# Patient Record
Sex: MALE | Race: WHITE | Employment: FULL TIME | URBAN - METROPOLITAN AREA
[De-identification: names, ages, dates, MRNs, and addresses within clinical notes are randomized per-mention and may not be internally consistent; named-entity substitution may affect disease eponyms.]

---

## 2017-11-13 ENCOUNTER — TELEPHONE (OUTPATIENT)
Dept: FAMILY MEDICINE CLINIC | Age: 52
End: 2017-11-13

## 2017-11-13 DIAGNOSIS — Z12.5 SCREENING FOR PROSTATE CANCER: ICD-10-CM

## 2017-11-13 DIAGNOSIS — R73.01 IFG (IMPAIRED FASTING GLUCOSE): ICD-10-CM

## 2017-11-13 DIAGNOSIS — E78.2 MIXED HYPERLIPIDEMIA: Primary | ICD-10-CM

## 2017-12-09 ENCOUNTER — HOSPITAL ENCOUNTER (OUTPATIENT)
Age: 52
Discharge: HOME | End: 2017-12-09
Payer: COMMERCIAL

## 2017-12-09 DIAGNOSIS — E78.2 MIXED HYPERLIPIDEMIA: ICD-10-CM

## 2017-12-09 DIAGNOSIS — R73.01 IFG (IMPAIRED FASTING GLUCOSE): ICD-10-CM

## 2017-12-09 DIAGNOSIS — Z12.5 SCREENING FOR PROSTATE CANCER: ICD-10-CM

## 2017-12-09 LAB
ALBUMIN SERPL-MCNC: 4.3 G/DL (ref 3.5–5.1)
ALP BLD-CCNC: 70 U/L (ref 38–126)
ALT SERPL-CCNC: 75 U/L (ref 11–66)
ANION GAP SERPL CALCULATED.3IONS-SCNC: 13 MEQ/L (ref 8–16)
AST SERPL-CCNC: 44 U/L (ref 5–40)
AVERAGE GLUCOSE: 108 MG/DL (ref 70–126)
BILIRUB SERPL-MCNC: 0.5 MG/DL (ref 0.3–1.2)
BUN BLDV-MCNC: 11 MG/DL (ref 7–22)
CALCIUM SERPL-MCNC: 9.4 MG/DL (ref 8.5–10.5)
CHLORIDE BLD-SCNC: 105 MEQ/L (ref 98–111)
CHOLESTEROL, TOTAL: 253 MG/DL (ref 100–199)
CO2: 26 MEQ/L (ref 23–33)
CREAT SERPL-MCNC: 0.9 MG/DL (ref 0.4–1.2)
GFR SERPL CREATININE-BSD FRML MDRD: 88 ML/MIN/1.73M2
GLUCOSE BLD-MCNC: 110 MG/DL (ref 70–108)
HBA1C MFR BLD: 5.6 % (ref 4.4–6.4)
HDLC SERPL-MCNC: 67 MG/DL
LDL CHOLESTEROL CALCULATED: 169 MG/DL
POTASSIUM SERPL-SCNC: 4.8 MEQ/L (ref 3.5–5.2)
PROSTATE SPECIFIC ANTIGEN: 0.82 NG/ML (ref 0–1)
SODIUM BLD-SCNC: 144 MEQ/L (ref 135–145)
TOTAL PROTEIN: 7.2 G/DL (ref 6.1–8)
TRIGL SERPL-MCNC: 83 MG/DL (ref 0–199)

## 2017-12-09 PROCEDURE — 36415 COLL VENOUS BLD VENIPUNCTURE: CPT

## 2017-12-09 PROCEDURE — 83036 HEMOGLOBIN GLYCOSYLATED A1C: CPT

## 2017-12-09 PROCEDURE — G0103 PSA SCREENING: HCPCS

## 2017-12-09 PROCEDURE — 80061 LIPID PANEL: CPT

## 2017-12-09 PROCEDURE — 80053 COMPREHEN METABOLIC PANEL: CPT

## 2017-12-11 ENCOUNTER — TELEPHONE (OUTPATIENT)
Dept: FAMILY MEDICINE CLINIC | Age: 52
End: 2017-12-11

## 2017-12-11 DIAGNOSIS — R74.01 ELEVATED AST (SGOT): Primary | ICD-10-CM

## 2017-12-11 DIAGNOSIS — R74.01 ELEVATED ALT MEASUREMENT: ICD-10-CM

## 2018-02-14 ENCOUNTER — OFFICE VISIT (OUTPATIENT)
Dept: FAMILY MEDICINE CLINIC | Age: 53
End: 2018-02-14
Payer: COMMERCIAL

## 2018-02-14 VITALS
RESPIRATION RATE: 16 BRPM | SYSTOLIC BLOOD PRESSURE: 110 MMHG | DIASTOLIC BLOOD PRESSURE: 70 MMHG | HEIGHT: 69 IN | WEIGHT: 207 LBS | TEMPERATURE: 98.1 F | HEART RATE: 64 BPM | BODY MASS INDEX: 30.66 KG/M2

## 2018-02-14 DIAGNOSIS — Z00.00 WELL ADULT EXAM: Primary | ICD-10-CM

## 2018-02-14 DIAGNOSIS — R79.89 ELEVATED LFTS: ICD-10-CM

## 2018-02-14 DIAGNOSIS — R53.83 OTHER FATIGUE: ICD-10-CM

## 2018-02-14 DIAGNOSIS — E78.2 MIXED HYPERLIPIDEMIA: ICD-10-CM

## 2018-02-14 DIAGNOSIS — N52.9 ERECTILE DYSFUNCTION, UNSPECIFIED ERECTILE DYSFUNCTION TYPE: ICD-10-CM

## 2018-02-14 PROCEDURE — 99396 PREV VISIT EST AGE 40-64: CPT | Performed by: FAMILY MEDICINE

## 2018-02-14 RX ORDER — SILDENAFIL CITRATE 20 MG/1
TABLET ORAL
Qty: 50 TABLET | Refills: 0 | Status: SHIPPED | OUTPATIENT
Start: 2018-02-14 | End: 2019-04-22 | Stop reason: ALTCHOICE

## 2018-02-14 RX ORDER — BETAMETHASONE DIPROPIONATE 0.5 MG/G
CREAM TOPICAL
Qty: 50 G | Refills: 0 | Status: SHIPPED | OUTPATIENT
Start: 2018-02-14 | End: 2019-10-10 | Stop reason: SDUPTHER

## 2018-02-14 ASSESSMENT — ENCOUNTER SYMPTOMS
CONSTIPATION: 0
SHORTNESS OF BREATH: 0
CHEST TIGHTNESS: 0
ANAL BLEEDING: 0
VOMITING: 0
DIARRHEA: 0
BLOOD IN STOOL: 0
ABDOMINAL PAIN: 0
NAUSEA: 0

## 2018-02-14 ASSESSMENT — PATIENT HEALTH QUESTIONNAIRE - PHQ9
1. LITTLE INTEREST OR PLEASURE IN DOING THINGS: 0
2. FEELING DOWN, DEPRESSED OR HOPELESS: 0
SUM OF ALL RESPONSES TO PHQ9 QUESTIONS 1 & 2: 0
SUM OF ALL RESPONSES TO PHQ QUESTIONS 1-9: 0

## 2018-02-14 NOTE — PROGRESS NOTES
Left Ear: External ear normal.   Nose: Nose normal.   Mouth/Throat: Oropharynx is clear and moist.   Eyes: Conjunctivae and EOM are normal. Pupils are equal, round, and reactive to light. Neck: Normal range of motion. Neck supple. Cardiovascular: Normal rate, regular rhythm and intact distal pulses. Pulmonary/Chest: Effort normal and breath sounds normal.   Abdominal: Soft. Bowel sounds are normal.   Genitourinary:   Genitourinary Comments: MARYURI deferred today as pt currently asymptomatic. Pt denies dysuria, hematuria, frequency, urgency, difficulty starting/ stopping stream, frequent nocturia    Will reconsider annually. MARYURI to be done at time of upcoming COLONOSCOPY. ES     Musculoskeletal: Normal range of motion. Neurological: He is alert and oriented to person, place, and time. He has normal reflexes. Skin: Skin is warm and dry. Mildly erythematous, hyperkeratotic lesion on left forearm- no bleeding, no drainage, no worrisome characteristics. Mildly erythematous beefy, red rash beneath bilateral breasts- no worrisome characteristics. Psychiatric: He has a normal mood and affect. His behavior is normal. Judgment and thought content normal.   Nursing note and vitals reviewed.       Component      Latest Ref Rng & Units 12/9/2017   Glucose      70 - 108 mg/dL 110 (H)   Creatinine      0.4 - 1.2 mg/dL 0.9   BUN      7 - 22 mg/dL 11   Sodium      135 - 145 meq/L 144   Potassium      3.5 - 5.2 meq/L 4.8   Chloride      98 - 111 meq/L 105   CO2      23 - 33 meq/L 26   Calcium      8.5 - 10.5 mg/dL 9.4   AST      5 - 40 U/L 44 (H)   Alk Phos      38 - 126 U/L 70   Total Protein      6.1 - 8.0 g/dL 7.2   Albumin      3.5 - 5.1 g/dL 4.3   Bilirubin      0.3 - 1.2 mg/dL 0.5   ALT      11 - 66 U/L 75 (H)   Cholesterol, Total      100 - 199 mg/dL 253 (H)   Triglycerides      0 - 199 mg/dL 83   HDL Cholesterol      mg/dL 67   LDL Calculated      mg/dL 169   Hemoglobin A1C      4.4 - 6.4 % 5.6

## 2018-07-11 ENCOUNTER — OFFICE VISIT (OUTPATIENT)
Dept: FAMILY MEDICINE CLINIC | Age: 53
End: 2018-07-11
Payer: COMMERCIAL

## 2018-07-11 VITALS
HEART RATE: 60 BPM | WEIGHT: 207.6 LBS | RESPIRATION RATE: 16 BRPM | BODY MASS INDEX: 30.88 KG/M2 | DIASTOLIC BLOOD PRESSURE: 72 MMHG | TEMPERATURE: 98 F | SYSTOLIC BLOOD PRESSURE: 112 MMHG

## 2018-07-11 DIAGNOSIS — S16.1XXA CERVICAL STRAIN, ACUTE, INITIAL ENCOUNTER: Primary | ICD-10-CM

## 2018-07-11 PROCEDURE — 3017F COLORECTAL CA SCREEN DOC REV: CPT | Performed by: NURSE PRACTITIONER

## 2018-07-11 PROCEDURE — G8427 DOCREV CUR MEDS BY ELIG CLIN: HCPCS | Performed by: NURSE PRACTITIONER

## 2018-07-11 PROCEDURE — 1036F TOBACCO NON-USER: CPT | Performed by: NURSE PRACTITIONER

## 2018-07-11 PROCEDURE — 99213 OFFICE O/P EST LOW 20 MIN: CPT | Performed by: NURSE PRACTITIONER

## 2018-07-11 PROCEDURE — G8417 CALC BMI ABV UP PARAM F/U: HCPCS | Performed by: NURSE PRACTITIONER

## 2018-07-11 RX ORDER — PREDNISONE 10 MG/1
10 TABLET ORAL SEE ADMIN INSTRUCTIONS
Qty: 30 TABLET | Refills: 0 | Status: SHIPPED | OUTPATIENT
Start: 2018-07-11 | End: 2018-07-11 | Stop reason: CLARIF

## 2018-07-11 RX ORDER — TIZANIDINE 4 MG/1
4 TABLET ORAL EVERY 8 HOURS PRN
Qty: 21 TABLET | Refills: 0 | Status: SHIPPED | OUTPATIENT
Start: 2018-07-11 | End: 2019-04-22 | Stop reason: ALTCHOICE

## 2018-07-11 RX ORDER — PREDNISONE 10 MG/1
10 TABLET ORAL SEE ADMIN INSTRUCTIONS
Qty: 30 TABLET | Refills: 0 | Status: SHIPPED | OUTPATIENT
Start: 2018-07-11 | End: 2018-07-23

## 2018-07-11 ASSESSMENT — ENCOUNTER SYMPTOMS
EYE DISCHARGE: 0
NAUSEA: 0
DIARRHEA: 0
STRIDOR: 0
BACK PAIN: 0
VOMITING: 0
SHORTNESS OF BREATH: 0
COUGH: 0
ABDOMINAL PAIN: 0
SORE THROAT: 0
COLOR CHANGE: 0
WHEEZING: 0
EYE PAIN: 0
RHINORRHEA: 0
CONSTIPATION: 0

## 2018-07-11 NOTE — PROGRESS NOTES
[facility]  [facility]  [address] [id number]  Dept: [phone]  Dept Fax: [phone]: [phone]     Visit Date:  7/11/2018      Patient:  Beckie Heredia  YOB: 1965    HPI:     Chief Complaint   Patient presents with    Neck Pain     ? pinched nerve in neck, burning pain in neck, no n/t into arms       Patient, who has intermittent neck pain with muscle spasms, presents with an acute onset over the last couple weeks of right cervical pain in the para spinous muscles down into the trapezius and the top of the shoulder. He denies radiculopathy down into the right arm, no numbness or tingling. He has had this off and on over the last several years and generally treats it with ibuprofen and rest.  He was seen here in 2016 for the same and evaluated by his PCP, with similar symptoms he is having today. Patient sees his chiropractor and has not been able to get relief currently. Neck Pain    Pertinent negatives include no chest pain, fever, headaches or weakness. Medications    Current Outpatient Prescriptions:     tiZANidine (ZANAFLEX) 4 MG tablet, Take 1 tablet by mouth every 8 hours as needed (neck pain), Disp: 21 tablet, Rfl: 0    predniSONE (DELTASONE) 10 MG tablet, Take 1 tablet by mouth See Admin Instructions for 12 days 40 mgs p.o x 3 day. 30 mgs p.o x 3 day. 20 mgs p.o x 3 day. 10 mgs p.o x 3 day., Disp: 30 tablet, Rfl: 0    sildenafil (REVATIO) 20 MG tablet, take 2-5 pills daily as needed for sexual activity. , Disp: 50 tablet, Rfl: 0    The patient has No Known Allergies. Past Medical History  Valerie Troncoso  has a past medical history of Erectile dysfunction and Hyperlipidemia. Subjective:      Review of Systems   Constitutional: Negative for activity change, appetite change, chills, fatigue and fever. HENT: Negative for congestion, ear pain, rhinorrhea and sore throat. Eyes: Negative for pain and discharge. Respiratory: Negative for cough, shortness of breath, wheezing and stridor. Cardiovascular: Negative for chest pain. Gastrointestinal: Negative for abdominal pain, constipation, diarrhea, nausea and vomiting. Genitourinary: Negative for dysuria, flank pain and frequency. Musculoskeletal: Positive for myalgias and neck pain. Negative for arthralgias and back pain. Skin: Negative for color change and rash. Allergic/Immunologic: Negative for immunocompromised state. Neurological: Negative for dizziness, weakness and headaches. Psychiatric/Behavioral: Negative. Objective:     /72   Pulse 60   Temp 98 °F (36.7 °C) (Oral)   Resp 16   Wt 207 lb 9.6 oz (94.2 kg) Comment: with steel toe boots  BMI 30.88 kg/m²     Physical Exam   Constitutional: He is oriented to person, place, and time. He appears well-developed and well-nourished. No distress. Eyes: Conjunctivae are normal. Right eye exhibits no discharge. Left eye exhibits no discharge. Cardiovascular: Normal rate. Pulmonary/Chest: Effort normal. No respiratory distress. Musculoskeletal: Normal range of motion. He exhibits tenderness. He exhibits no edema. Neurological: He is alert and oriented to person, place, and time. Skin: Skin is warm and dry. No rash noted. He is not diaphoretic. No erythema. No pallor. Psychiatric: He has a normal mood and affect. His behavior is normal. Judgment and thought content normal.   Nursing note and vitals reviewed. Assessment/Plan:      Presents with normal range of motion, but increased pain with flexing and twisting of his head. He is tender to palpation in the right cervical paraspinous muscle group down into his trapezius and the top of his right shoulder. No obvious cramping is noted but the muscle groups are tight. Normal PMS of right arm and hand. Patient declines further evaluation via MRI or other testing.   He would prefer to stay conservative with prednisone and

## 2018-07-31 ENCOUNTER — TELEPHONE (OUTPATIENT)
Dept: FAMILY MEDICINE CLINIC | Age: 53
End: 2018-07-31

## 2018-07-31 DIAGNOSIS — M54.2 CERVICAL PAIN: ICD-10-CM

## 2018-07-31 DIAGNOSIS — S16.1XXD STRAIN OF NECK MUSCLE, SUBSEQUENT ENCOUNTER: Primary | ICD-10-CM

## 2019-04-22 ENCOUNTER — OFFICE VISIT (OUTPATIENT)
Dept: FAMILY MEDICINE CLINIC | Age: 54
End: 2019-04-22
Payer: COMMERCIAL

## 2019-04-22 VITALS
DIASTOLIC BLOOD PRESSURE: 70 MMHG | HEART RATE: 60 BPM | WEIGHT: 201 LBS | TEMPERATURE: 98.6 F | HEIGHT: 68 IN | BODY MASS INDEX: 30.46 KG/M2 | RESPIRATION RATE: 16 BRPM | SYSTOLIC BLOOD PRESSURE: 100 MMHG

## 2019-04-22 DIAGNOSIS — R73.01 IFG (IMPAIRED FASTING GLUCOSE): ICD-10-CM

## 2019-04-22 DIAGNOSIS — Z00.00 LABORATORY EXAM ORDERED AS PART OF ROUTINE GENERAL MEDICAL EXAMINATION: ICD-10-CM

## 2019-04-22 DIAGNOSIS — Z13.220 SCREENING, LIPID: ICD-10-CM

## 2019-04-22 DIAGNOSIS — N52.9 ERECTILE DYSFUNCTION, UNSPECIFIED ERECTILE DYSFUNCTION TYPE: ICD-10-CM

## 2019-04-22 DIAGNOSIS — Z13.1 SCREENING FOR DIABETES MELLITUS: ICD-10-CM

## 2019-04-22 DIAGNOSIS — E78.2 MIXED HYPERLIPIDEMIA: ICD-10-CM

## 2019-04-22 DIAGNOSIS — Z00.00 WELL ADULT EXAM: Primary | ICD-10-CM

## 2019-04-22 DIAGNOSIS — Z12.5 SCREENING PSA (PROSTATE SPECIFIC ANTIGEN): ICD-10-CM

## 2019-04-22 DIAGNOSIS — R68.82 DECREASED LIBIDO: ICD-10-CM

## 2019-04-22 PROCEDURE — 99396 PREV VISIT EST AGE 40-64: CPT | Performed by: FAMILY MEDICINE

## 2019-04-22 RX ORDER — SILDENAFIL 100 MG/1
50-100 TABLET, FILM COATED ORAL DAILY PRN
Qty: 30 TABLET | Refills: 1 | Status: SHIPPED | OUTPATIENT
Start: 2019-04-22 | End: 2022-01-31 | Stop reason: SDUPTHER

## 2019-04-22 ASSESSMENT — ENCOUNTER SYMPTOMS
CONSTIPATION: 0
DIARRHEA: 0
ABDOMINAL PAIN: 0
BLOOD IN STOOL: 0
SHORTNESS OF BREATH: 0
VOMITING: 0
ANAL BLEEDING: 0
NAUSEA: 0
CHEST TIGHTNESS: 0

## 2019-04-22 ASSESSMENT — PATIENT HEALTH QUESTIONNAIRE - PHQ9
SUM OF ALL RESPONSES TO PHQ9 QUESTIONS 1 & 2: 0
2. FEELING DOWN, DEPRESSED OR HOPELESS: 0
SUM OF ALL RESPONSES TO PHQ QUESTIONS 1-9: 0
SUM OF ALL RESPONSES TO PHQ QUESTIONS 1-9: 0
1. LITTLE INTEREST OR PLEASURE IN DOING THINGS: 0

## 2019-04-22 NOTE — PROGRESS NOTES
sounds normal.   Abdominal: Soft. Bowel sounds are normal.   Genitourinary:   Genitourinary Comments: MARYURI done 4/17/2018 per Dr. Torres Griffiths- unremarkable. MARYURI deferred today as pt currently asymptomatic. Pt denies dysuria, hematuria, frequency, urgency, difficulty starting/ stopping stream, frequent nocturia    Will reconsider annually. ES     Musculoskeletal: Normal range of motion. Neurological: He is alert and oriented to person, place, and time. He has normal reflexes. Skin: Skin is warm and dry. Psychiatric: He has a normal mood and affect. His behavior is normal. Judgment and thought content normal.   Nursing note and vitals reviewed.       Lab Results   Component Value Date    LABA1C 5.6 12/09/2017     Lab Results   Component Value Date    CHOL 253 (H) 12/09/2017    TRIG 83 12/09/2017    HDL 67 12/09/2017    LDLCALC 169 12/09/2017    LDLDIRECT 188 11/19/2015       The 10-year ASCVD risk score (Venkatesh Cardoso, et al., 2013) is: 3.1%    Values used to calculate the score:      Age: 48 years      Sex: Male      Is Non- : No      Diabetic: No      Tobacco smoker: No      Systolic Blood Pressure: 067 mmHg      Is BP treated: No      HDL Cholesterol: 67 mg/dL      Total Cholesterol: 253 mg/dL    Lab Results   Component Value Date     12/09/2017    K 4.8 12/09/2017     12/09/2017    CO2 26 12/09/2017    BUN 11 12/09/2017    CREATININE 0.9 12/09/2017    GLUCOSE 110 (H) 12/09/2017    CALCIUM 9.4 12/09/2017    PROT 7.2 12/09/2017    LABALBU 4.3 12/09/2017    BILITOT 0.5 12/09/2017    ALKPHOS 70 12/09/2017    AST 44 (H) 12/09/2017    ALT 75 (H) 12/09/2017    LABGLOM 88 (A) 12/09/2017     No results found for: LABMICR, [id number]    No results found for: TSH, D0UMCJU, A0PRQLV, THYROIDAB, FT3, T4FREE    No results found for: WBC, HGB, HCT, MCV, PLT    Component      Latest Ref Rng & Units 12/9/2017          10:05 AM   Prostatic Specific Ag      0.00 - 1.00 ng/ml 0.82 4/22/2019)  Viagra 100 mg- 1/2-1 pill daily as needed for Erectile Dysfunction. #30/1 refill. Continue to work on diet, exercise (30 minutes, 5x/week), and weight loss for optimal cardiovascular health. Recheck HGA1C, FLP, CMP, PSA, and  FREE & TOTAL TESTOSTERONE at this time  Continue current medicines  No other refills needed today.    Follow up in 12 months if feeling well and labs well controlled       Electronically signed Nia Mata MD on 4/22/2019 at 1:42 PM

## 2019-04-22 NOTE — PATIENT INSTRUCTIONS
Encouraged annual FLU SHOT- pt declines. (updated 4/22/2019)  COLONOSCOPY done 4/17/2018 per Dr. Shahnaz Brandt- 1 polyp- to do again in 2023  Pt declines HIV and HEP C screening at this time due to lack of risk factors. (updated 4/22/2019)  Viagra 100 mg- 1/2-1 pill daily as needed for Erectile Dysfunction. #30/1 refill. Continue to work on diet, exercise (30 minutes, 5x/week), and weight loss for optimal cardiovascular health. Recheck HGA1C, FLP, CMP, PSA, and  FREE & TOTAL TESTOSTERONE at this time  Continue current medicines  No other refills needed today.    Follow up in 12 months if feeling well and labs well controlled

## 2019-04-24 LAB
AVERAGE GLUCOSE: NORMAL
CHOLESTEROL, TOTAL: 251 MG/DL
CHOLESTEROL/HDL RATIO: ABNORMAL
HBA1C MFR BLD: 5.5 %
HDLC SERPL-MCNC: 63 MG/DL (ref 35–70)
LDL CHOLESTEROL CALCULATED: 170 MG/DL (ref 0–160)
PROSTATE SPECIFIC ANTIGEN: 0.85 NG/ML
TRIGL SERPL-MCNC: 92 MG/DL
VLDLC SERPL CALC-MCNC: ABNORMAL MG/DL

## 2019-07-17 ENCOUNTER — TELEPHONE (OUTPATIENT)
Dept: FAMILY MEDICINE CLINIC | Age: 54
End: 2019-07-17

## 2019-07-19 ENCOUNTER — TELEPHONE (OUTPATIENT)
Dept: FAMILY MEDICINE CLINIC | Age: 54
End: 2019-07-19

## 2019-07-19 DIAGNOSIS — R74.01 ELEVATED ALT MEASUREMENT: Primary | ICD-10-CM

## 2019-10-10 ENCOUNTER — OFFICE VISIT (OUTPATIENT)
Dept: FAMILY MEDICINE CLINIC | Age: 54
End: 2019-10-10
Payer: COMMERCIAL

## 2019-10-10 VITALS
DIASTOLIC BLOOD PRESSURE: 72 MMHG | WEIGHT: 202.4 LBS | HEART RATE: 68 BPM | SYSTOLIC BLOOD PRESSURE: 114 MMHG | TEMPERATURE: 98.7 F | RESPIRATION RATE: 15 BRPM | BODY MASS INDEX: 30.77 KG/M2

## 2019-10-10 DIAGNOSIS — R05.9 COUGH: Primary | ICD-10-CM

## 2019-10-10 DIAGNOSIS — R53.83 FATIGUE, UNSPECIFIED TYPE: ICD-10-CM

## 2019-10-10 DIAGNOSIS — R11.11 NON-INTRACTABLE VOMITING WITHOUT NAUSEA, UNSPECIFIED VOMITING TYPE: ICD-10-CM

## 2019-10-10 PROCEDURE — 1036F TOBACCO NON-USER: CPT | Performed by: NURSE PRACTITIONER

## 2019-10-10 PROCEDURE — G8417 CALC BMI ABV UP PARAM F/U: HCPCS | Performed by: NURSE PRACTITIONER

## 2019-10-10 PROCEDURE — G8427 DOCREV CUR MEDS BY ELIG CLIN: HCPCS | Performed by: NURSE PRACTITIONER

## 2019-10-10 PROCEDURE — 3017F COLORECTAL CA SCREEN DOC REV: CPT | Performed by: NURSE PRACTITIONER

## 2019-10-10 PROCEDURE — G8484 FLU IMMUNIZE NO ADMIN: HCPCS | Performed by: NURSE PRACTITIONER

## 2019-10-10 PROCEDURE — 99213 OFFICE O/P EST LOW 20 MIN: CPT | Performed by: NURSE PRACTITIONER

## 2019-10-10 RX ORDER — BETAMETHASONE DIPROPIONATE 0.5 MG/G
CREAM TOPICAL
Qty: 50 G | Refills: 0 | Status: SHIPPED | OUTPATIENT
Start: 2019-10-10 | End: 2019-11-09

## 2019-10-10 RX ORDER — BENZONATATE 100 MG/1
100-200 CAPSULE ORAL 3 TIMES DAILY PRN
Qty: 30 CAPSULE | Refills: 0 | Status: SHIPPED | OUTPATIENT
Start: 2019-10-10 | End: 2019-10-17

## 2019-10-11 ASSESSMENT — ENCOUNTER SYMPTOMS
ABDOMINAL PAIN: 0
CHEST TIGHTNESS: 0
VOMITING: 1
BLOOD IN STOOL: 0
COUGH: 1
EYES NEGATIVE: 1
SHORTNESS OF BREATH: 0

## 2021-06-01 ENCOUNTER — TELEPHONE (OUTPATIENT)
Dept: FAMILY MEDICINE CLINIC | Age: 56
End: 2021-06-01

## 2021-06-04 NOTE — TELEPHONE ENCOUNTER
Called pt and LM on VM asking him to call the office back if he is still interested in scheduling an appointment.

## 2022-01-31 ENCOUNTER — NURSE ONLY (OUTPATIENT)
Dept: LAB | Age: 57
End: 2022-01-31

## 2022-01-31 ENCOUNTER — OFFICE VISIT (OUTPATIENT)
Dept: FAMILY MEDICINE CLINIC | Age: 57
End: 2022-01-31
Payer: COMMERCIAL

## 2022-01-31 VITALS
HEART RATE: 64 BPM | SYSTOLIC BLOOD PRESSURE: 114 MMHG | DIASTOLIC BLOOD PRESSURE: 72 MMHG | BODY MASS INDEX: 31.78 KG/M2 | RESPIRATION RATE: 16 BRPM | WEIGHT: 209 LBS

## 2022-01-31 DIAGNOSIS — R79.89 DECREASED TESTOSTERONE LEVEL: ICD-10-CM

## 2022-01-31 DIAGNOSIS — E66.9 CLASS 1 OBESITY WITHOUT SERIOUS COMORBIDITY IN ADULT, UNSPECIFIED BMI, UNSPECIFIED OBESITY TYPE: ICD-10-CM

## 2022-01-31 DIAGNOSIS — R05.9 COUGH: Primary | ICD-10-CM

## 2022-01-31 DIAGNOSIS — Z13.1 SCREENING FOR DIABETES MELLITUS: ICD-10-CM

## 2022-01-31 DIAGNOSIS — Z00.00 LABORATORY EXAM ORDERED AS PART OF ROUTINE GENERAL MEDICAL EXAMINATION: ICD-10-CM

## 2022-01-31 DIAGNOSIS — E78.2 MIXED HYPERLIPIDEMIA: ICD-10-CM

## 2022-01-31 DIAGNOSIS — R53.83 OTHER FATIGUE: ICD-10-CM

## 2022-01-31 DIAGNOSIS — N52.9 ERECTILE DYSFUNCTION, UNSPECIFIED ERECTILE DYSFUNCTION TYPE: ICD-10-CM

## 2022-01-31 DIAGNOSIS — Z13.220 LIPID SCREENING: ICD-10-CM

## 2022-01-31 DIAGNOSIS — U09.9 POST-COVID-19 CONDITION: ICD-10-CM

## 2022-01-31 DIAGNOSIS — Z12.5 SCREENING PSA (PROSTATE SPECIFIC ANTIGEN): ICD-10-CM

## 2022-01-31 LAB
ALBUMIN SERPL-MCNC: 4.2 G/DL (ref 3.5–5.1)
ALP BLD-CCNC: 70 U/L (ref 38–126)
ALT SERPL-CCNC: 43 U/L (ref 11–66)
ANION GAP SERPL CALCULATED.3IONS-SCNC: 14 MEQ/L (ref 8–16)
AST SERPL-CCNC: 36 U/L (ref 5–40)
AVERAGE GLUCOSE: 114 MG/DL (ref 70–126)
BASOPHILS # BLD: 0.5 %
BASOPHILS ABSOLUTE: 0 THOU/MM3 (ref 0–0.1)
BILIRUB SERPL-MCNC: 0.6 MG/DL (ref 0.3–1.2)
BUN BLDV-MCNC: 10 MG/DL (ref 7–22)
CALCIUM SERPL-MCNC: 9 MG/DL (ref 8.5–10.5)
CHLORIDE BLD-SCNC: 103 MEQ/L (ref 98–111)
CHOLESTEROL, TOTAL: 196 MG/DL (ref 100–199)
CO2: 23 MEQ/L (ref 23–33)
CREAT SERPL-MCNC: 1 MG/DL (ref 0.4–1.2)
EOSINOPHIL # BLD: 2.2 %
EOSINOPHILS ABSOLUTE: 0.1 THOU/MM3 (ref 0–0.4)
ERYTHROCYTE [DISTWIDTH] IN BLOOD BY AUTOMATED COUNT: 12.1 % (ref 11.5–14.5)
ERYTHROCYTE [DISTWIDTH] IN BLOOD BY AUTOMATED COUNT: 40.9 FL (ref 35–45)
GFR SERPL CREATININE-BSD FRML MDRD: 77 ML/MIN/1.73M2
GLUCOSE BLD-MCNC: 101 MG/DL (ref 70–108)
HBA1C MFR BLD: 5.8 % (ref 4.4–6.4)
HCT VFR BLD CALC: 44.7 % (ref 42–52)
HDLC SERPL-MCNC: 48 MG/DL
HEMOGLOBIN: 15.2 GM/DL (ref 14–18)
IMMATURE GRANS (ABS): 0.01 THOU/MM3 (ref 0–0.07)
IMMATURE GRANULOCYTES: 0.2 %
LDL CHOLESTEROL CALCULATED: 125 MG/DL
LYMPHOCYTES # BLD: 27.7 %
LYMPHOCYTES ABSOLUTE: 1.6 THOU/MM3 (ref 1–4.8)
MCH RBC QN AUTO: 31.2 PG (ref 26–33)
MCHC RBC AUTO-ENTMCNC: 34 GM/DL (ref 32.2–35.5)
MCV RBC AUTO: 91.8 FL (ref 80–94)
MONOCYTES # BLD: 10.4 %
MONOCYTES ABSOLUTE: 0.6 THOU/MM3 (ref 0.4–1.3)
NUCLEATED RED BLOOD CELLS: 0 /100 WBC
PLATELET # BLD: 427 THOU/MM3 (ref 130–400)
PMV BLD AUTO: 9.6 FL (ref 9.4–12.4)
POTASSIUM SERPL-SCNC: 4.7 MEQ/L (ref 3.5–5.2)
PROSTATE SPECIFIC ANTIGEN: 1.27 NG/ML (ref 0–1)
RBC # BLD: 4.87 MILL/MM3 (ref 4.7–6.1)
SEG NEUTROPHILS: 59 %
SEGMENTED NEUTROPHILS ABSOLUTE COUNT: 3.5 THOU/MM3 (ref 1.8–7.7)
SODIUM BLD-SCNC: 140 MEQ/L (ref 135–145)
T4 FREE: 1.49 NG/DL (ref 0.93–1.76)
TOTAL PROTEIN: 6.8 G/DL (ref 6.1–8)
TRIGL SERPL-MCNC: 116 MG/DL (ref 0–199)
TSH SERPL DL<=0.05 MIU/L-ACNC: 1.98 UIU/ML (ref 0.4–4.2)
WBC # BLD: 5.9 THOU/MM3 (ref 4.8–10.8)

## 2022-01-31 PROCEDURE — 99214 OFFICE O/P EST MOD 30 MIN: CPT | Performed by: FAMILY MEDICINE

## 2022-01-31 RX ORDER — SILDENAFIL 100 MG/1
50-100 TABLET, FILM COATED ORAL DAILY PRN
Qty: 30 TABLET | Refills: 1 | Status: SHIPPED | OUTPATIENT
Start: 2022-01-31

## 2022-01-31 RX ORDER — KETOCONAZOLE 20 MG/ML
SHAMPOO TOPICAL
COMMUNITY
Start: 2022-01-24

## 2022-01-31 RX ORDER — DEXTROMETHORPHAN HYDROBROMIDE AND PROMETHAZINE HYDROCHLORIDE 15; 6.25 MG/5ML; MG/5ML
5 SYRUP ORAL 4 TIMES DAILY PRN
Qty: 140 ML | Refills: 0 | Status: SHIPPED | OUTPATIENT
Start: 2022-01-31 | End: 2022-02-07

## 2022-01-31 ASSESSMENT — ENCOUNTER SYMPTOMS
SHORTNESS OF BREATH: 0
DIARRHEA: 0
CONSTIPATION: 0
ABDOMINAL PAIN: 0
BLOOD IN STOOL: 0
ANAL BLEEDING: 0
CHEST TIGHTNESS: 0
VOMITING: 1
NAUSEA: 0

## 2022-01-31 NOTE — PROGRESS NOTES
[facility]  [facility]  Dept: [phone]  Dept Fax: [phone]    SUBJECTIVE     Jadyn Levi is a 64 y.o.male    Pt presents for cough. Pt diagnosed with COVID 19 ~ 2 weeks ago (home test +)- quarantined for 5 days as recommended- pt then developed cough ~ 6 days after diagnosed. Cough is dry in nature- worse with sitting up/ standing up. Calmed down with Juan Ernst only. Better with laying down. Pt feeling better at this time. Pt feeling ok since last visit- interval history and any new issues noted below:     Glucometer readings at home are not needed. The home BP readings have been in the 100-110's / 60-70's range. Checking \"every now and again\"    Wt Readings from Last 3 Encounters:   01/31/22 209 lb (94.8 kg)   10/10/19 202 lb 6.4 oz (91.8 kg)   04/22/19 201 lb (91.2 kg)   Weight increased 7# since last visit 27 months ago. Patient Active Problem List   Diagnosis    Hyperlipidemia    Erectile dysfunction       Current Outpatient Medications   Medication Sig Dispense Refill    ketoconazole (NIZORAL) 2 % shampoo apply 1 APPLICATION ONTO THE SKIN daily      sildenafil (VIAGRA) 100 MG tablet Take 0.5-1 tablets by mouth daily as needed for Erectile Dysfunction 30 tablet 1    promethazine-dextromethorphan (PROMETHAZINE-DM) 6.25-15 MG/5ML syrup Take 5 mLs by mouth 4 times daily as needed for Cough 140 mL 0    Misc Natural Products (OSTEO BI-FLEX TRIPLE STRENGTH PO) Take by mouth daily       No current facility-administered medications for this visit. Review of Systems   Constitutional: Negative for chills, diaphoresis, fatigue, fever and unexpected weight change. Eyes: Negative for visual disturbance. Respiratory: Negative for chest tightness and shortness of breath. Cardiovascular: Negative for chest pain, palpitations and leg swelling. Gastrointestinal: Positive for vomiting (post-tussive emesis).  Negative for abdominal pain, anal bleeding, blood in stool, constipation, diarrhea and nausea. Genitourinary: Negative for dysuria and hematuria. Musculoskeletal: Negative for neck pain. Neurological: Negative for dizziness, light-headedness and headaches. OBJECTIVE     /72   Pulse 64   Resp 16   Wt 209 lb (94.8 kg)   BMI 31.78 kg/m²   Body mass index is 31.78 kg/m². BP Readings from Last 3 Encounters:   01/31/22 114/72   10/10/19 114/72   04/22/19 100/70     Physical Exam  Vitals and nursing note reviewed. Constitutional:       Appearance: He is well-developed. HENT:      Head: Normocephalic and atraumatic. Right Ear: External ear normal.      Left Ear: External ear normal.      Nose: Nose normal.   Eyes:      Conjunctiva/sclera: Conjunctivae normal.      Pupils: Pupils are equal, round, and reactive to light. Cardiovascular:      Rate and Rhythm: Normal rate and regular rhythm. Pulmonary:      Effort: Pulmonary effort is normal.      Breath sounds: Normal breath sounds. Abdominal:      General: Bowel sounds are normal.      Palpations: Abdomen is soft. Genitourinary:     Comments: MARYURI deferred today as pt currently asymptomatic. Pt denies dysuria, hematuria, frequency, urgency, difficulty starting/ stopping stream, frequent nocturia    Will reconsider annually. ES    Musculoskeletal:         General: Normal range of motion. Cervical back: Normal range of motion and neck supple. Skin:     General: Skin is warm and dry. Neurological:      Mental Status: He is alert and oriented to person, place, and time. Deep Tendon Reflexes: Reflexes are normal and symmetric. Psychiatric:         Behavior: Behavior normal.         Thought Content: Thought content normal.         Judgment: Judgment normal.         No results found for this visit on 01/31/22.     Lab Results   Component Value Date    LABA1C 5.5 04/24/2019       Lab Results   Component Value Date    CHOL 251 04/24/2019    TRIG 92 04/24/2019 HDL 63 04/24/2019    LDLCALC 170 (A) 04/24/2019    LDLDIRECT 188 11/19/2015     The 10-year ASCVD risk score (Mela Quintero, et al., 2013) is: 5.3%    Values used to calculate the score:      Age: 64 years      Sex: Male      Is Non- : No      Diabetic: No      Tobacco smoker: No      Systolic Blood Pressure: 039 mmHg      Is BP treated: No      HDL Cholesterol: 63 mg/dL      Total Cholesterol: 251 mg/dL    Lab Results   Component Value Date     12/09/2017    K 4.8 12/09/2017     12/09/2017    CO2 26 12/09/2017    BUN 11 12/09/2017    CREATININE 0.9 12/09/2017    GLUCOSE 110 (H) 12/09/2017    CALCIUM 9.4 12/09/2017    PROT 7.2 12/09/2017    LABALBU 4.3 12/09/2017    BILITOT 0.5 12/09/2017    ALKPHOS 70 12/09/2017    AST 44 (H) 12/09/2017    ALT 75 (H) 12/09/2017    LABGLOM 88 (A) 12/09/2017     No results found for: LABMICR, [id number]    No results found for: TSH, G9QUMPC, T1GQANC, THYROIDAB, FT3, T4FREE    No results found for: WBC, HGB, HCT, MCV, PLT    Lab Results   Component Value Date    PSA 0.85 04/24/2019    PSA 0.82 12/09/2017       Immunization History   Administered Date(s) Administered    COVID-19, Bhavana Mead, Primary or Immunocompromised, PF, 100mcg/0.5mL 03/30/2021, 04/27/2021    Td (Adult), 5 Lf Tetanus Toxoid, Pf (Tenivac, Decavac) 12/01/2008    Td, unspecified formulation 12/01/2008    Tdap (Boostrix, Adacel) 02/14/2018    Zoster Recombinant (Shingrix) 02/15/2017, 02/14/2018, 04/18/2018       Health Maintenance   Topic Date Due    Depression Screen  Never done    COVID-19 Vaccine (3 - Booster for Moderna series) 09/27/2021    Flu vaccine (1) 01/31/2023 (Originally 9/1/2021)    Lipid screen  04/24/2024    DTaP/Tdap/Td vaccine (2 - Td or Tdap) 02/14/2028    Colon cancer screen colonoscopy  04/17/2028    Shingles Vaccine  Completed    Hepatitis A vaccine  Aged Out    Hepatitis B vaccine  Aged Out    Hib vaccine  Aged Out    Meningococcal (ACWY) vaccine Aged Out    Pneumococcal 0-64 years Vaccine  Aged Out    Hepatitis C screen  Discontinued    HIV screen  Discontinued     CT Lung Screen (50-80, 20 pk-yrs, smoking or quit <15 years) indicated at this time? No tobacco history  Sleep Medicine referral indicated at this time (Obesity, Snoring, Daytime Somnolence, Apneic Episodes)? Pt denies any current symptoms. No future appointments. ASSESSMENT       Diagnosis Orders   1. Cough  promethazine-dextromethorphan (PROMETHAZINE-DM) 6.25-15 MG/5ML syrup   2. Post-COVID-19 condition     3. Erectile dysfunction, unspecified erectile dysfunction type  sildenafil (VIAGRA) 100 MG tablet    T4, Free    TSH without Reflex    Testosterone, free, total   4. Mixed hyperlipidemia  Hemoglobin A1C    Lipid Panel    Comprehensive Metabolic Panel    T4, Free    TSH without Reflex    CBC Auto Differential   5. Decreased testosterone level  Testosterone, free, total   6. Other fatigue  Testosterone, free, total   7. Class 1 obesity without serious comorbidity in adult, unspecified BMI, unspecified obesity type  Hemoglobin A1C    Lipid Panel    Comprehensive Metabolic Panel    T4, Free    TSH without Reflex    CBC Auto Differential    Testosterone, free, total   8. Laboratory exam ordered as part of routine general medical examination  Hemoglobin A1C    Lipid Panel    Comprehensive Metabolic Panel    T4, Free    TSH without Reflex    CBC Auto Differential    PSA screening    Testosterone, free, total   9. Lipid screening  Lipid Panel   10. Screening for diabetes mellitus  Hemoglobin A1C    Comprehensive Metabolic Panel   11. Screening PSA (prostate specific antigen)  PSA screening       PLAN      After discussion with pt, will hold on x-rays at this time as pt afebrile and symptoms improving with unremarkable exam today. Will treat with Phenergan DM- 5 ml orally every 6 hours. #140 ml/ no refills. Refill Viagra 100 mg- 1/2-1 pill daily as needed for Erectile Dysfunction. #30/1 refill. Continue to work on diet, exercise (30 minutes, 5x/week), and weight loss for optimal cardiovascular health. Check HGA1C, FLP, CMP, TSH/ FREE T4, CBC, FREE/ TOTAL TESTOSTERONE, and PSA at this time  Continue current medicines  Follow up in 12 months if feeling well and labs well controlled          Preventive Health Topics:  Encouraged COVID VACCINE booster ~ 3 months from recent COVID 19 diagnosis. Encouraged annual FLU SHOT- pt declines.  (updated 1/31/2022)  COLONOSCOPY done 4/17/2018 per Dr. Malachi Valero- 1 polyp- to do again in 2023 (updated 1/31/2022)             Electronically signed by Sheela Pedersen MD on 1/31/2022 at 10:45 AM

## 2022-01-31 NOTE — PATIENT INSTRUCTIONS
After discussion with pt, will hold on x-rays at this time as pt afebrile and symptoms improving with unremarkable exam today. Will treat with Phenergan DM- 5 ml orally every 6 hours. #140 ml/ no refills. Refill Viagra 100 mg- 1/2-1 pill daily as needed for Erectile Dysfunction. #30/1 refill. Continue to work on diet, exercise (30 minutes, 5x/week), and weight loss for optimal cardiovascular health. Check HGA1C, FLP, CMP, TSH/ FREE T4, CBC, FREE/ TOTAL TESTOSTERONE, and PSA at this time  Continue current medicines  Follow up in 12 months if feeling well and labs well controlled          Preventive Health Topics:  Encouraged COVID VACCINE booster ~ 3 months from recent COVID 19 diagnosis. Encouraged annual FLU SHOT- pt declines.  (updated 1/31/2022)  COLONOSCOPY done 4/17/2018 per Dr. Nuzhat Howard- 1 polyp- to do again in 2023 (updated 1/31/2022)

## 2022-02-02 LAB — TESTOSTERONE FREE: NORMAL

## 2022-02-08 ENCOUNTER — TELEPHONE (OUTPATIENT)
Dept: FAMILY MEDICINE CLINIC | Age: 57
End: 2022-02-08

## 2022-02-08 DIAGNOSIS — R79.89 ELEVATED TESTOSTERONE LEVEL IN MALE: ICD-10-CM

## 2022-02-08 DIAGNOSIS — R79.89 ELEVATED PLATELET COUNT: ICD-10-CM

## 2022-02-08 DIAGNOSIS — E78.2 MIXED HYPERLIPIDEMIA: Primary | ICD-10-CM

## 2022-02-08 DIAGNOSIS — R79.89 DECREASED FREE TESTOSTERONE LEVEL IN MALE: ICD-10-CM

## 2022-02-08 NOTE — TELEPHONE ENCOUNTER
Spoke with pt. He is agreeable to medication. His pharmacy is [facility]. He would like the referral to urology and that has been placed. Current labs have been ordered and mailed.

## 2022-02-08 NOTE — TELEPHONE ENCOUNTER
----- Message from Aletha Schirmer, MD sent at 2/2/2022  7:21 AM EST -----  Notify pt-   Results reviewed. Total testosterone overall mildly elevated, however percentage of free testosterone slightly low-would recommend appointment with urology for further evaluation. CBC okay, except platelets mildly elevated at [id number] okay at 1.27TSH/free T4 okayFLP okay overall- current guidelines (any value > 5%) suggest considering cholesterol medicine due to elevated 10 year risk of CVD (5.1%) -is pt interested/ willing to consider starting cholesterol medicine? CMP okayHG A1c okay  Let me know regarding cholesterol medicine as we may need to check follow-up labs in addition to labs mentioned below. Recheck CBC in 2 to 3 months to assure normalization of platelets  ES

## 2022-02-08 NOTE — TELEPHONE ENCOUNTER
Discussion noted. Start Lipitor 10 mg - 1 pill daily. #30/2 refills. Recheck D-LDL and AST/ALT with previously mentioned CBC in 2-3 months.   ES

## 2022-02-09 RX ORDER — ATORVASTATIN CALCIUM 10 MG/1
10 TABLET, FILM COATED ORAL DAILY
Qty: 30 TABLET | Refills: 2 | Status: SHIPPED | OUTPATIENT
Start: 2022-02-09

## 2022-04-07 ENCOUNTER — OFFICE VISIT (OUTPATIENT)
Dept: UROLOGY | Age: 57
End: 2022-04-07
Payer: COMMERCIAL

## 2022-04-07 VITALS
HEIGHT: 69 IN | BODY MASS INDEX: 30.36 KG/M2 | WEIGHT: 205 LBS | SYSTOLIC BLOOD PRESSURE: 108 MMHG | DIASTOLIC BLOOD PRESSURE: 72 MMHG

## 2022-04-07 DIAGNOSIS — N40.1 BPH WITH OBSTRUCTION/LOWER URINARY TRACT SYMPTOMS: ICD-10-CM

## 2022-04-07 DIAGNOSIS — R97.20 ELEVATED PSA: ICD-10-CM

## 2022-04-07 DIAGNOSIS — N13.8 BPH WITH OBSTRUCTION/LOWER URINARY TRACT SYMPTOMS: ICD-10-CM

## 2022-04-07 DIAGNOSIS — N52.01 ERECTILE DYSFUNCTION DUE TO ARTERIAL INSUFFICIENCY: Primary | ICD-10-CM

## 2022-04-07 PROCEDURE — 99203 OFFICE O/P NEW LOW 30 MIN: CPT | Performed by: UROLOGY

## 2022-04-07 NOTE — PROGRESS NOTES
Emani Leonardo MD   Urology Clinic office Visit      Patient:  Vida Hahn  YOB: 1965  Date: 4/7/2022    HISTORY OF PRESENT ILLNESS:   The patient is a 64 y.o. male who presents today for evaluation of the following problem(s):      1. Erectile dysfunction due to arterial insufficiency    2. Elevated PSA    3. BPH with obstruction/lower urinary tract symptoms           Overall the problem(s) : are worsening. Associated Symptoms: No dysuria, gross hematuria. Pain Severity:      Summary of old records: N/A  (Patient's old records, notes and chart reviewed and summarized above.)      Onset months  Reports some ED, is taking medications and is helping  Feels well otherwise  No changes in vision  Just some fatigue  High T as noted below  Not on TRT or natural supplements  No headaches, no changes in vision  No LUTS, no  hx. No fhx of PCa  1/2022   Testosterone, Free Calculation                 117         Testosterone, Adult Male                       904 H   300-890      Last several PSA's:  Lab Results   Component Value Date    PSA 1.27 (H) 01/31/2022    PSA 0.85 04/24/2019    PSA 0.82 12/09/2017       Last total testosterone:  No results found for: TESTOSTERONE    Urinalysis today:  No results found for this visit on 04/07/22.       Last BUN and creatinine:  Lab Results   Component Value Date    BUN 10 01/31/2022     Lab Results   Component Value Date    CREATININE 1.0 01/31/2022       Imaging Reviewed during this Office Visit:   (results were independently reviewed by physician and radiology report verified)    PAST MEDICAL, FAMILY AND SOCIAL HISTORY:  Past Medical History:   Diagnosis Date    Erectile dysfunction     Hyperlipidemia      Past Surgical History:   Procedure Laterality Date    SKIN CANCER EXCISION  03/2022    WISDOM TOOTH EXTRACTION  1989     Family History   Problem Relation Age of Onset    High Blood Pressure Father     High Blood Pressure Brother     Parkinsonism Maternal Grandfather     Cancer Maternal Grandfather         Lung cancer    Cancer Paternal Grandfather         Throat cancer    Cancer Brother         Colon, metastatic to Lung     Outpatient Medications Marked as Taking for the 4/7/22 encounter (Office Visit) with Lashonda Brunner MD   Medication Sig Dispense Refill    atorvastatin (LIPITOR) 10 MG tablet Take 1 tablet by mouth daily 30 tablet 2    ketoconazole (NIZORAL) 2 % shampoo apply 1 APPLICATION ONTO THE SKIN daily      sildenafil (VIAGRA) 100 MG tablet Take 0.5-1 tablets by mouth daily as needed for Erectile Dysfunction 30 tablet 1    Misc Natural Products (OSTEO BI-FLEX TRIPLE STRENGTH PO) Take by mouth daily         Patient has no known allergies. Social History     Tobacco Use   Smoking Status Never Smoker   Smokeless Tobacco Former User    Types: Chew       Social History     Substance and Sexual Activity   Alcohol Use Yes    Alcohol/week: 8.0 standard drinks    Types: 8 Standard drinks or equivalent per week    Comment: occasional       REVIEW OF SYSTEMS:  Constitutional: negative  Eyes: negative  Respiratory: negative  Cardiovascular: negative  Gastrointestinal: negative  Musculoskeletal: negative  Genitourinary: negative except for what is in HPI  Skin: negative   Neurological: negative  Hematological/Lymphatic: negative  Psychological: negative    Physical Exam:    This a 64 y.o. male   Vitals:    04/07/22 1440   BP: 108/72     Constitutional: Patient in no acute distress; Neuro: alert and oriented to person place and time. Psych: Mood and affect normal.  Skin: Normal  Lungs: Respiratory effort normal  Cardiovascular:  Normal peripheral pulses  Abdomen: Soft, non-tender, non-distended   Bladder non-tender and not distended. Lymphatics: no palpable lymphadenopathy  Gait is within normal limits  Musculoskeletal: Normal range of motion       Assessment and Plan      1.  Erectile dysfunction due to arterial insufficiency 2. Elevated PSA    3. BPH with obstruction/lower urinary tract symptoms           Repeat labs in 3 months  ED: Viagra PRN  Exam today WNL      Prescriptions Ordered:  No orders of the defined types were placed in this encounter.      Orders Placed:  Orders Placed This Encounter   Procedures    PSA, Diagnostic     Standing Status:   Future     Standing Expiration Date:   4/7/2023    Testosterone, free, total     Standing Status:   Future     Standing Expiration Date:   4/7/2023            MD Taran Lemus M.D, MD  [facility]

## 2022-05-26 ENCOUNTER — TELEPHONE (OUTPATIENT)
Dept: FAMILY MEDICINE CLINIC | Age: 57
End: 2022-05-26

## 2022-05-26 NOTE — TELEPHONE ENCOUNTER
----- Message from Gavin Licea sent at 5/26/2022  9:55 AM EDT -----  Subject: Message to Provider    QUESTIONS  Information for Provider? Patient called stating he received letter in   regards of his appointment 9/15/2022 @ 8:15am needs to schedule for new   location [address] [place] [id number]  ---------------------------------------------------------------------------  --------------  Amber ANSARI  What is the best way for the office to contact you? OK to leave message on   voicemail  Preferred Call Back Phone Number?  [phone]  ---------------------------------------------------------------------------  --------------  SCRIPT ANSWERS  undefined

## 2022-07-01 ENCOUNTER — NURSE ONLY (OUTPATIENT)
Dept: LAB | Age: 57
End: 2022-07-01

## 2022-07-01 DIAGNOSIS — N13.8 BPH WITH OBSTRUCTION/LOWER URINARY TRACT SYMPTOMS: ICD-10-CM

## 2022-07-01 DIAGNOSIS — N52.01 ERECTILE DYSFUNCTION DUE TO ARTERIAL INSUFFICIENCY: ICD-10-CM

## 2022-07-01 DIAGNOSIS — R97.20 ELEVATED PSA: ICD-10-CM

## 2022-07-01 DIAGNOSIS — N40.1 BPH WITH OBSTRUCTION/LOWER URINARY TRACT SYMPTOMS: ICD-10-CM

## 2022-07-01 LAB — PROSTATE SPECIFIC ANTIGEN: 1.02 NG/ML (ref 0–1)

## 2022-07-04 LAB — TESTOSTERONE FREE: NORMAL

## 2022-07-07 ENCOUNTER — OFFICE VISIT (OUTPATIENT)
Dept: UROLOGY | Age: 57
End: 2022-07-07
Payer: COMMERCIAL

## 2022-07-07 VITALS
HEIGHT: 69 IN | SYSTOLIC BLOOD PRESSURE: 114 MMHG | BODY MASS INDEX: 31.7 KG/M2 | DIASTOLIC BLOOD PRESSURE: 68 MMHG | WEIGHT: 214 LBS

## 2022-07-07 DIAGNOSIS — N52.01 ERECTILE DYSFUNCTION DUE TO ARTERIAL INSUFFICIENCY: Primary | ICD-10-CM

## 2022-07-07 DIAGNOSIS — N13.8 BPH WITH OBSTRUCTION/LOWER URINARY TRACT SYMPTOMS: ICD-10-CM

## 2022-07-07 DIAGNOSIS — R97.20 ELEVATED PSA: ICD-10-CM

## 2022-07-07 DIAGNOSIS — N40.1 BPH WITH OBSTRUCTION/LOWER URINARY TRACT SYMPTOMS: ICD-10-CM

## 2022-07-07 PROCEDURE — 99213 OFFICE O/P EST LOW 20 MIN: CPT | Performed by: UROLOGY

## 2022-07-07 NOTE — PROGRESS NOTES
Regina Ramirez MD   Urology Clinic office Visit      Patient:  John Farias  YOB: 1965  Date: 7/7/2022    HISTORY OF PRESENT ILLNESS:   The patient is a 62 y.o. male who presents today for evaluation of the following problem(s):      1. Erectile dysfunction due to arterial insufficiency    2. Elevated PSA    3. BPH with obstruction/lower urinary tract symptoms           Overall the problem(s) : are improved  Associated Symptoms: No dysuria, gross hematuria. Pain Severity:      Summary of old records: N/A  (Patient's old records, notes and chart reviewed and summarized above.)      Onset months  Reports some ED, is taking medications and is helping  Feels well otherwise  No changes in vision  Just some fatigue  High T as noted below  Not on TRT or natural supplements  No headaches, no changes in vision  No LUTS, no  hx. No fhx of PCa  1/2022   Testosterone, Free Calculation                 117         Testosterone, Adult Male                       904 H   300-890    7/1/2022 total T 694, Free T 73      PSA 1.02 7/1/2022  viagra 100 mg PRN      Last several PSA's:  Lab Results   Component Value Date    PSA 1.02 (H) 07/01/2022    PSA 1.27 (H) 01/31/2022    PSA 0.85 04/24/2019       Last total testosterone:  No results found for: TESTOSTERONE    Urinalysis today:  No results found for this visit on 07/07/22.       Last BUN and creatinine:  Lab Results   Component Value Date    BUN 10 01/31/2022     Lab Results   Component Value Date    CREATININE 1.0 01/31/2022       Imaging Reviewed during this Office Visit:   (results were independently reviewed by physician and radiology report verified)    PAST MEDICAL, FAMILY AND SOCIAL HISTORY:  Past Medical History:   Diagnosis Date    Erectile dysfunction     Hyperlipidemia      Past Surgical History:   Procedure Laterality Date    SKIN CANCER EXCISION  03/2022    WISDOM TOOTH EXTRACTION  1989     Family History   Problem Relation Age of Onset  High Blood Pressure Father     High Blood Pressure Brother     Parkinsonism Maternal Grandfather     Cancer Maternal Grandfather         Lung cancer    Cancer Paternal Grandfather         Throat cancer    Cancer Brother         Colon, metastatic to Lung     Outpatient Medications Marked as Taking for the 7/7/22 encounter (Office Visit) with Vipul Zhu MD   Medication Sig Dispense Refill    ketoconazole (NIZORAL) 2 % shampoo apply 1 APPLICATION ONTO THE SKIN daily      sildenafil (VIAGRA) 100 MG tablet Take 0.5-1 tablets by mouth daily as needed for Erectile Dysfunction 30 tablet 1    Misc Natural Products (OSTEO BI-FLEX TRIPLE STRENGTH PO) Take by mouth daily         Patient has no known allergies. Social History     Tobacco Use   Smoking Status Never Smoker   Smokeless Tobacco Former User    Types: Chew       Social History     Substance and Sexual Activity   Alcohol Use Yes    Alcohol/week: 8.0 standard drinks    Types: 8 Standard drinks or equivalent per week    Comment: occasional       REVIEW OF SYSTEMS:  Constitutional: negative  Eyes: negative  Respiratory: negative  Cardiovascular: negative  Gastrointestinal: negative  Musculoskeletal: negative  Genitourinary: negative except for what is in HPI  Skin: negative   Neurological: negative  Hematological/Lymphatic: negative  Psychological: negative    Physical Exam:    This a 62 y.o. male   Vitals:    07/07/22 0809   BP: 114/68     Constitutional: Patient in no acute distress; Neuro: alert and oriented to person place and time. Psych: Mood and affect normal.  Skin: Normal  Lungs: Respiratory effort normal  Cardiovascular:  Normal peripheral pulses  Abdomen: Soft, non-tender, non-distended   Bladder non-tender and not distended. Lymphatics: no palpable lymphadenopathy  Gait is within normal limits  Musculoskeletal: Normal range of motion       Assessment and Plan      1. Erectile dysfunction due to arterial insufficiency    2.  Elevated PSA    3. BPH with obstruction/lower urinary tract symptoms           Labs today improved and he is doing well  Repeat in 6 months, if normal then will annually   ED: Viagra PRN        Prescriptions Ordered:  No orders of the defined types were placed in this encounter.      Orders Placed:  Orders Placed This Encounter   Procedures    PSA, Diagnostic     Standing Status:   Future     Standing Expiration Date:   7/7/2023    Testosterone, free, total     Standing Status:   Future     Standing Expiration Date:   7/7/2023            MD Lupe Covington M.D, MD  [facility] [place] [facility]

## 2022-08-31 ENCOUNTER — NURSE ONLY (OUTPATIENT)
Dept: LAB | Age: 57
End: 2022-08-31

## 2022-08-31 DIAGNOSIS — E78.2 MIXED HYPERLIPIDEMIA: ICD-10-CM

## 2022-08-31 DIAGNOSIS — R79.89 ELEVATED PLATELET COUNT: ICD-10-CM

## 2022-08-31 LAB
ALT SERPL-CCNC: 50 U/L (ref 11–66)
AST SERPL-CCNC: 40 U/L (ref 5–40)
BASOPHILS # BLD: 1 %
BASOPHILS ABSOLUTE: 0.1 THOU/MM3 (ref 0–0.1)
EOSINOPHIL # BLD: 3.8 %
EOSINOPHILS ABSOLUTE: 0.2 THOU/MM3 (ref 0–0.4)
ERYTHROCYTE [DISTWIDTH] IN BLOOD BY AUTOMATED COUNT: 12.7 % (ref 11.5–14.5)
ERYTHROCYTE [DISTWIDTH] IN BLOOD BY AUTOMATED COUNT: 43.3 FL (ref 35–45)
HCT VFR BLD CALC: 47.4 % (ref 42–52)
HEMOGLOBIN: 16.1 GM/DL (ref 14–18)
IMMATURE GRANS (ABS): 0.01 THOU/MM3 (ref 0–0.07)
IMMATURE GRANULOCYTES: 0.2 %
LDL CHOLESTEROL DIRECT: 171.68 MG/DL
LYMPHOCYTES # BLD: 34.7 %
LYMPHOCYTES ABSOLUTE: 1.7 THOU/MM3 (ref 1–4.8)
MCH RBC QN AUTO: 31.3 PG (ref 26–33)
MCHC RBC AUTO-ENTMCNC: 34 GM/DL (ref 32.2–35.5)
MCV RBC AUTO: 92.2 FL (ref 80–94)
MONOCYTES # BLD: 10.2 %
MONOCYTES ABSOLUTE: 0.5 THOU/MM3 (ref 0.4–1.3)
NUCLEATED RED BLOOD CELLS: 0 /100 WBC
PLATELET # BLD: 259 THOU/MM3 (ref 130–400)
PMV BLD AUTO: 10.1 FL (ref 9.4–12.4)
RBC # BLD: 5.14 MILL/MM3 (ref 4.7–6.1)
SEG NEUTROPHILS: 50.1 %
SEGMENTED NEUTROPHILS ABSOLUTE COUNT: 2.5 THOU/MM3 (ref 1.8–7.7)
WBC # BLD: 5 THOU/MM3 (ref 4.8–10.8)

## 2022-09-06 ENCOUNTER — OFFICE VISIT (OUTPATIENT)
Dept: FAMILY MEDICINE CLINIC | Age: 57
End: 2022-09-06
Payer: COMMERCIAL

## 2022-09-06 VITALS
RESPIRATION RATE: 12 BRPM | HEART RATE: 62 BPM | WEIGHT: 204.2 LBS | DIASTOLIC BLOOD PRESSURE: 72 MMHG | OXYGEN SATURATION: 98 % | SYSTOLIC BLOOD PRESSURE: 114 MMHG | HEIGHT: 69 IN | BODY MASS INDEX: 30.24 KG/M2 | TEMPERATURE: 97.2 F

## 2022-09-06 DIAGNOSIS — R79.89 ELEVATED PLATELET COUNT: ICD-10-CM

## 2022-09-06 DIAGNOSIS — K64.9 HEMORRHOIDS, UNSPECIFIED HEMORRHOID TYPE: ICD-10-CM

## 2022-09-06 DIAGNOSIS — Z13.31 SCREENING FOR DEPRESSION: ICD-10-CM

## 2022-09-06 DIAGNOSIS — E78.2 MIXED HYPERLIPIDEMIA: ICD-10-CM

## 2022-09-06 DIAGNOSIS — Z00.00 WELL ADULT EXAM: Primary | ICD-10-CM

## 2022-09-06 PROCEDURE — 99396 PREV VISIT EST AGE 40-64: CPT | Performed by: STUDENT IN AN ORGANIZED HEALTH CARE EDUCATION/TRAINING PROGRAM

## 2022-09-06 RX ORDER — HYDROCORTISONE 25 MG/G
CREAM TOPICAL
Qty: 30 G | Refills: 1 | Status: SHIPPED | OUTPATIENT
Start: 2022-09-06

## 2022-09-06 SDOH — ECONOMIC STABILITY: FOOD INSECURITY: WITHIN THE PAST 12 MONTHS, THE FOOD YOU BOUGHT JUST DIDN'T LAST AND YOU DIDN'T HAVE MONEY TO GET MORE.: NEVER TRUE

## 2022-09-06 SDOH — ECONOMIC STABILITY: FOOD INSECURITY: WITHIN THE PAST 12 MONTHS, YOU WORRIED THAT YOUR FOOD WOULD RUN OUT BEFORE YOU GOT MONEY TO BUY MORE.: NEVER TRUE

## 2022-09-06 ASSESSMENT — ENCOUNTER SYMPTOMS
CONSTIPATION: 0
NAUSEA: 0
DIARRHEA: 0
RECTAL PAIN: 1
BLOOD IN STOOL: 1
ANAL BLEEDING: 1
SHORTNESS OF BREATH: 0
ABDOMINAL PAIN: 0
VOMITING: 0

## 2022-09-06 ASSESSMENT — PATIENT HEALTH QUESTIONNAIRE - PHQ9
SUM OF ALL RESPONSES TO PHQ QUESTIONS 1-9: 0
1. LITTLE INTEREST OR PLEASURE IN DOING THINGS: 0
SUM OF ALL RESPONSES TO PHQ QUESTIONS 1-9: 0
2. FEELING DOWN, DEPRESSED OR HOPELESS: 0
SUM OF ALL RESPONSES TO PHQ9 QUESTIONS 1 & 2: 0

## 2022-09-06 ASSESSMENT — SOCIAL DETERMINANTS OF HEALTH (SDOH): HOW HARD IS IT FOR YOU TO PAY FOR THE VERY BASICS LIKE FOOD, HOUSING, MEDICAL CARE, AND HEATING?: NOT HARD AT ALL

## 2022-09-06 NOTE — PROGRESS NOTES
r  [facility]  [address]  [facility] [address]  Dept: [phone]  Dept Fax: [phone]  Loc: [phone]      Roxy Guerrier is a 62 y.o. male who presents today for:  Chief Complaint   Patient presents with    Annual Exam     Pt states he would like to discuss hemorrhoids. HPI:   Roxy Guerrier is 62 y.o. who presents today for annual physical.    Only acute complaint is dealing with hemorrhoids. Has been experiencing since April, has been using recticare OTC, has also tried preparation-H, has not tried anusol. Has had symptom relief when using these products. Symptoms include itching and rectal pain. Has noticed occasional bright red bleeding with bowel movements, its intermittent. Experiences the itching every day, bleeding is more inconsistent. Has been inconsistent with Lipitor, states is bad at taking pills. 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 Does not follow any specific dietary plans, no dietary modifications.       The 10-year ASCVD risk score (Kaity De La Fuente, et al., 2013) is: 5.6%    Values used to calculate the score:      Age: 62 years      Sex: Male      Is Non- : No      Diabetic: No      Tobacco smoker: No      Systolic Blood Pressure: 332 mmHg      Is BP treated: No      HDL Cholesterol: 48 mg/dL      Total Cholesterol: 196 mg/dL      Objective:     Vitals:    09/06/22 0808   BP: 114/72   Pulse: 62   Resp: 12   Temp: 97.2 °F (36.2 °C)   SpO2: 98%         Wt Readings from Last 3 Encounters:   09/06/22 204 lb 3.2 oz (92.6 kg)   07/07/22 214 lb (97.1 kg)   04/07/22 205 lb (93 kg)       BP Readings from Last 3 Encounters:   09/06/22 114/72   07/07/22 114/68   04/07/22 108/72       Lab Results   Component Value Date    WBC 5.0 08/31/2022    HGB 16.1 08/31/2022    HCT 47.4 08/31/2022    MCV 92.2 08/31/2022     08/31/2022     Lab Results   Component Value Date     01/31/2022    K 4.7 01/31/2022     01/31/2022    CO2 23 01/31/2022    BUN 10 01/31/2022    CREATININE 1.0 01/31/2022    GLUCOSE 101 01/31/2022    CALCIUM 9.0 01/31/2022    PROT 6.8 01/31/2022    LABALBU 4.2 01/31/2022    BILITOT 0.6 01/31/2022    ALKPHOS 70 01/31/2022    AST 40 08/31/2022    ALT 50 08/31/2022    LABGLOM 77 (A) 01/31/2022     Lab Results   Component Value Date    TSH 1.980 01/31/2022    T4FREE 1.49 01/31/2022     Lab Results   Component Value Date    LABA1C 5.8 01/31/2022     No results found for: EAG  Lab Results   Component Value Date    CHOL 196 01/31/2022    CHOL 251 04/24/2019    CHOL 253 (H) 12/09/2017     Lab Results   Component Value Date    TRIG 116 01/31/2022    TRIG 92 04/24/2019    TRIG 83 12/09/2017     Lab Results   Component Value Date    HDL 48 01/31/2022    HDL 63 04/24/2019    HDL 67 12/09/2017     Lab Results   Component Value Date    LDLCALC 125 01/31/2022    LDLCALC 170 (A) 04/24/2019    LDLCALC 169 12/09/2017     Lab Results   Component Value Date    PSA 1.02 (H) 07/01/2022    PSA 1.27 (H) 01/31/2022    PSA 0.85 04/24/2019     No results found for: QOMAVAOY04  No results found for: VITD25       No results found for: LABMICR, [id number]    Review of Systems   Constitutional:  Negative for chills, fatigue and fever. Respiratory:  Negative for shortness of breath. Cardiovascular:  Negative for chest pain and palpitations. Gastrointestinal:  Positive for anal bleeding, blood in stool and rectal pain. Negative for abdominal pain, constipation, diarrhea, nausea and vomiting. Genitourinary:  Negative for hematuria. Skin:  Negative for rash. Neurological:  Negative for dizziness, light-headedness and headaches. Physical Exam  Exam conducted with a chaperone present. Constitutional:       Appearance: Normal appearance. HENT:      Head: Normocephalic and atraumatic.       Right Ear: Tympanic membrane and external ear normal.      Left Ear: Tympanic membrane and external ear normal.      Mouth/Throat:      Mouth: Mucous membranes are moist.   Cardiovascular:      Rate and Rhythm: Normal rate and regular rhythm. Heart sounds: Normal heart sounds. No murmur heard. No gallop. Pulmonary:      Effort: Pulmonary effort is normal. No respiratory distress. Breath sounds: Normal breath sounds. No wheezing. Abdominal:      General: Abdomen is flat. There is no distension. Palpations: Abdomen is soft. Tenderness: There is no abdominal tenderness. There is no guarding. Genitourinary:     Prostate: Normal.      Rectum: Normal.      Comments: Rectal exam grossly normal, no masses appreciated. No prostate masses   Musculoskeletal:         General: Normal range of motion. Skin:     General: Skin is warm. Neurological:      Mental Status: He is alert. Immunization History   Administered Date(s) Administered    COVID-19, MODERNA BLUE border, Primary or Immunocompromised, (age 12y+), IM, 100 mcg/0.5mL 03/30/2021, 04/27/2021    Td (Adult), 5 Lf Tetanus Toxoid, Pf (Tenivac, Decavac) 12/01/2008    Td, unspecified formulation 12/01/2008    Tdap (Boostrix, Adacel) 02/14/2018    Zoster Recombinant (Shingrix) 02/15/2017, 02/14/2018, 04/18/2018       Health Maintenance Due   Topic Date Due    Depression Screen  Never done       Food Insecurity: No Food Insecurity    Worried About Running Out of Food in the Last Year: Never true    Ran Out of Food in the Last Year: Never true       Assessment / Plan:   1. Well adult exam  - TSH; Future  - T4, Free; Future  - Comprehensive Metabolic Panel; Future    2. Screening for depression  - [place] DEPRESSION SCREEN ANNUAL    3. Mixed hyperlipidemia  - Lipid, Fasting; Future    4. Elevated platelet count  - CBC with Auto Differential; Future    5.  Hemorrhoids, unspecified hemorrhoid type  - Begin anusol-HC for suspected hemorrhoids, no abnormal findings noted on rectal exam in office  - Counseled on increasing fiber in diet, avoiding excessive straining and excessive time on toilet   - Due for next colonoscopy in 2023, follow up sooner if continuing to notice rectal bleeding   - hydrocortisone (ANUSOL-HC) 2.5 % CREA rectal cream; Apply topically twice daily  Dispense: 30 g; Refill: 1        Encouraged annual FLU SHOT- pt declines. (updated 9/6/2022)  Encouraged Covid-19 Booster Shot - pt declines (updated 9/6/2022)   COLONOSCOPY done 4/17/2018 per Dr. Sparrow Dye- 1 polyp- to do again in 2023  Prostate exam performed in office 9/6/22 with no gross abnormalities   Pt previously declined HIV and Hep C screening   Continue to work on diet, exercise (30 minutes, 5x/week), and weight loss for optimal cardiovascular health. Recheck FLP, CMP, CBC, TSH and T4 in Jan 2023 prior to next physical   Continue current medicines  No other refills needed today  Follow up in 12 months if feeling well and labs well controlled    Specialists:          Return in about 1 year (around 9/6/2023) for annual physical .      Future Appointments   Date Time Provider Linus Sherwoodi   12/15/2022  8:15 AM Roney Pichardo MD [address] [place] [address]   10/5/2023  8:30 AM [email or web address]       Patient given educational materials - see patient instructions. Discussed use, benefit, and sideeffects of prescribed medications. All patient questions answered. Pt voiced understanding. Reviewed health maintenance. Instructed to continue current medications, diet and exercise. Patient agreed with treatment plan. Follow up as directed.      Electronically signed by Eitan Bush DO on 9/6/2022 at 9:29 AM

## 2022-09-06 NOTE — PROGRESS NOTES
S: 62 y.o. male with   Chief Complaint   Patient presents with    Annual Exam     Pt states he would like to discuss hemorrhoids. Reviewed hx and ROS in detail with resident. See notes for additional details. BP Readings from Last 3 Encounters:   09/06/22 114/72   07/07/22 114/68   04/07/22 108/72     Wt Readings from Last 3 Encounters:   09/06/22 204 lb 3.2 oz (92.6 kg)   07/07/22 214 lb (97.1 kg)   04/07/22 205 lb (93 kg)           O: VS:  height is 5' 9\" (1.753 m) and weight is 204 lb 3.2 oz (92.6 kg). His temperature is 97.2 °F (36.2 °C). His blood pressure is 114/72 and his pulse is 62. His respiration is 12 and oxygen saturation is 98%. Diagnosis Orders   1. Well adult exam        2. Screening for depression  [place] DEPRESSION SCREEN ANNUAL          Plan  Well adult exam-care gaps discussed and health maintenance    Flare of hemorrhoids exam unremarkable trial of Anusol HC if not promptly improved refer back to GI for evaluation due for colonoscopy in less than a year anyway      Health Maintenance Due   Topic Date Due    Depression Screen  Never done    COVID-19 Vaccine (3 - Booster for Trudy Carlos series) 09/27/2021    Flu vaccine (1) Never done         Attending Physician Statement  I have discussed the case, including pertinent history and exam findings with the resident. I agree with the documented assessment and plan as documented by the resident.       Almas Falk MD 9/6/2022 8:43 AM

## 2022-12-09 DIAGNOSIS — E78.2 MIXED HYPERLIPIDEMIA: ICD-10-CM

## 2022-12-09 RX ORDER — ATORVASTATIN CALCIUM 10 MG/1
TABLET, FILM COATED ORAL
Qty: 90 TABLET | Refills: 3 | Status: SHIPPED | OUTPATIENT
Start: 2022-12-09

## 2022-12-09 NOTE — TELEPHONE ENCOUNTER
Patient's last appointment was : 9/6/2022  Patient's next appointment is : 10/5/2023  Future Appointments   Date Time Provider Linus Fowler   12/15/2022  8:15 AM Geremias Wilkerson MD [facility] [address]   10/5/2023  8:30 AM Liss Banks MD [email or web address]     Last refilled:    Lab Results   Component Value Date    LABA1C 5.8 01/31/2022     Lab Results   Component Value Date    CHOL 196 01/31/2022    TRIG 116 01/31/2022    HDL 48 01/31/2022    LDLCALC 125 01/31/2022    LDLDIRECT 171.68 08/31/2022     Lab Results   Component Value Date     01/31/2022    K 4.7 01/31/2022     01/31/2022    CO2 23 01/31/2022    BUN 10 01/31/2022    CREATININE 1.0 01/31/2022    GLUCOSE 101 01/31/2022    CALCIUM 9.0 01/31/2022    PROT 6.8 01/31/2022    LABALBU 4.2 01/31/2022    BILITOT 0.6 01/31/2022    ALKPHOS 70 01/31/2022    AST 40 08/31/2022    ALT 50 08/31/2022    LABGLOM 77 (A) 01/31/2022     Lab Results   Component Value Date    TSH 1.980 01/31/2022    T4FREE 1.49 01/31/2022     Lab Results   Component Value Date    WBC 5.0 08/31/2022    HGB 16.1 08/31/2022    HCT 47.4 08/31/2022    MCV 92.2 08/31/2022     08/31/2022

## 2023-07-03 DIAGNOSIS — N52.9 ERECTILE DYSFUNCTION, UNSPECIFIED ERECTILE DYSFUNCTION TYPE: ICD-10-CM

## 2023-07-05 RX ORDER — SILDENAFIL 100 MG/1
50-100 TABLET, FILM COATED ORAL DAILY PRN
Qty: 30 TABLET | Refills: 1 | Status: SHIPPED | OUTPATIENT
Start: 2023-07-05

## 2023-07-05 NOTE — TELEPHONE ENCOUNTER
Patient's last appointment was : 9/6/2022  Patient's next appointment is :  10/5/2023  Last refilled: 1/31/2022   Pharmacy Verified    Lab Results   Component Value Date    LABA1C 5.8 01/31/2022     Lab Results   Component Value Date    CHOL 196 01/31/2022    TRIG 116 01/31/2022    HDL 48 01/31/2022    LDLCALC 125 01/31/2022    LDLDIRECT 171.68 08/31/2022     Lab Results   Component Value Date     01/31/2022    K 4.7 01/31/2022     01/31/2022    CO2 23 01/31/2022    BUN 10 01/31/2022    CREATININE 1.0 01/31/2022    GLUCOSE 101 01/31/2022    CALCIUM 9.0 01/31/2022    PROT 6.8 01/31/2022    LABALBU 4.2 01/31/2022    BILITOT 0.6 01/31/2022    ALKPHOS 70 01/31/2022    AST 40 08/31/2022    ALT 50 08/31/2022    LABGLOM 77 (A) 01/31/2022     Lab Results   Component Value Date    TSH 1.980 01/31/2022    T4FREE 1.49 01/31/2022     Lab Results   Component Value Date    WBC 5.0 08/31/2022    HGB 16.1 08/31/2022    HCT 47.4 08/31/2022    MCV 92.2 08/31/2022     08/31/2022

## 2023-10-03 ASSESSMENT — PATIENT HEALTH QUESTIONNAIRE - PHQ9
SUM OF ALL RESPONSES TO PHQ9 QUESTIONS 1 & 2: 0
SUM OF ALL RESPONSES TO PHQ QUESTIONS 1-9: 0
2. FEELING DOWN, DEPRESSED OR HOPELESS: 0
SUM OF ALL RESPONSES TO PHQ QUESTIONS 1-9: 0
SUM OF ALL RESPONSES TO PHQ9 QUESTIONS 1 & 2: 0
SUM OF ALL RESPONSES TO PHQ QUESTIONS 1-9: 0
2. FEELING DOWN, DEPRESSED OR HOPELESS: NOT AT ALL
1. LITTLE INTEREST OR PLEASURE IN DOING THINGS: 0
1. LITTLE INTEREST OR PLEASURE IN DOING THINGS: NOT AT ALL
SUM OF ALL RESPONSES TO PHQ QUESTIONS 1-9: 0

## 2023-10-05 ENCOUNTER — OFFICE VISIT (OUTPATIENT)
Dept: FAMILY MEDICINE CLINIC | Age: 58
End: 2023-10-05

## 2023-10-05 VITALS
RESPIRATION RATE: 16 BRPM | HEIGHT: 69 IN | WEIGHT: 204.2 LBS | TEMPERATURE: 98.5 F | SYSTOLIC BLOOD PRESSURE: 122 MMHG | DIASTOLIC BLOOD PRESSURE: 64 MMHG | OXYGEN SATURATION: 97 % | HEART RATE: 85 BPM | BODY MASS INDEX: 30.24 KG/M2

## 2023-10-05 DIAGNOSIS — N52.9 ERECTILE DYSFUNCTION, UNSPECIFIED ERECTILE DYSFUNCTION TYPE: ICD-10-CM

## 2023-10-05 DIAGNOSIS — Z00.00 WELL ADULT EXAM: Primary | ICD-10-CM

## 2023-10-05 DIAGNOSIS — Z13.220 LIPID SCREENING: ICD-10-CM

## 2023-10-05 DIAGNOSIS — E78.2 MIXED HYPERLIPIDEMIA: ICD-10-CM

## 2023-10-05 DIAGNOSIS — Z00.00 LABORATORY EXAM ORDERED AS PART OF ROUTINE GENERAL MEDICAL EXAMINATION: ICD-10-CM

## 2023-10-05 DIAGNOSIS — Z13.1 SCREENING FOR DIABETES MELLITUS: ICD-10-CM

## 2023-10-05 DIAGNOSIS — Z12.5 SCREENING PSA (PROSTATE SPECIFIC ANTIGEN): ICD-10-CM

## 2023-10-05 PROCEDURE — 99396 PREV VISIT EST AGE 40-64: CPT | Performed by: FAMILY MEDICINE

## 2023-10-05 SDOH — ECONOMIC STABILITY: INCOME INSECURITY: HOW HARD IS IT FOR YOU TO PAY FOR THE VERY BASICS LIKE FOOD, HOUSING, MEDICAL CARE, AND HEATING?: NOT VERY HARD

## 2023-10-05 SDOH — ECONOMIC STABILITY: FOOD INSECURITY: WITHIN THE PAST 12 MONTHS, YOU WORRIED THAT YOUR FOOD WOULD RUN OUT BEFORE YOU GOT MONEY TO BUY MORE.: NEVER TRUE

## 2023-10-05 SDOH — ECONOMIC STABILITY: HOUSING INSECURITY
IN THE LAST 12 MONTHS, WAS THERE A TIME WHEN YOU DID NOT HAVE A STEADY PLACE TO SLEEP OR SLEPT IN A SHELTER (INCLUDING NOW)?: NO

## 2023-10-05 SDOH — ECONOMIC STABILITY: FOOD INSECURITY: WITHIN THE PAST 12 MONTHS, THE FOOD YOU BOUGHT JUST DIDN'T LAST AND YOU DIDN'T HAVE MONEY TO GET MORE.: NEVER TRUE

## 2023-10-05 ASSESSMENT — ENCOUNTER SYMPTOMS
ANAL BLEEDING: 0
NAUSEA: 0
DIARRHEA: 0
BLOOD IN STOOL: 0
VOMITING: 0
CHEST TIGHTNESS: 0
CONSTIPATION: 0
ABDOMINAL PAIN: 0
SHORTNESS OF BREATH: 0

## 2023-10-05 NOTE — PATIENT INSTRUCTIONS
Continue to work on diet, exercise, and weight loss for optimal cardiovascular health  Continue current meds  No refills needed at this time. Check HG A1c, FLP, CMP, and PSA at this time. Follow up in 12 months. Preventive Health Topics (updated 10/5/2023):  Encouraged COVID VACCINE booster-patient declines  Encouraged FLU VACCINE-patient declines  COLONOSCOPY done 4/17/2018 per Dr. Jeremias Collins- 1 polyp- to do again in 2023-patient to contact office for cash price as pt currently without insurance.

## 2023-10-14 ENCOUNTER — NURSE ONLY (OUTPATIENT)
Dept: LAB | Age: 58
End: 2023-10-14

## 2023-10-14 DIAGNOSIS — E78.2 MIXED HYPERLIPIDEMIA: ICD-10-CM

## 2023-10-14 DIAGNOSIS — Z13.1 SCREENING FOR DIABETES MELLITUS: ICD-10-CM

## 2023-10-14 DIAGNOSIS — Z00.00 LABORATORY EXAM ORDERED AS PART OF ROUTINE GENERAL MEDICAL EXAMINATION: ICD-10-CM

## 2023-10-14 DIAGNOSIS — Z13.220 LIPID SCREENING: ICD-10-CM

## 2023-10-14 DIAGNOSIS — N52.9 ERECTILE DYSFUNCTION, UNSPECIFIED ERECTILE DYSFUNCTION TYPE: ICD-10-CM

## 2023-10-14 DIAGNOSIS — Z12.5 SCREENING PSA (PROSTATE SPECIFIC ANTIGEN): ICD-10-CM

## 2023-10-14 LAB
ALBUMIN SERPL BCG-MCNC: 4.5 G/DL (ref 3.5–5.1)
ALP SERPL-CCNC: 79 U/L (ref 38–126)
ALT SERPL W/O P-5'-P-CCNC: 56 U/L (ref 11–66)
ANION GAP SERPL CALC-SCNC: 12 MEQ/L (ref 8–16)
AST SERPL-CCNC: 37 U/L (ref 5–40)
BILIRUB SERPL-MCNC: 0.5 MG/DL (ref 0.3–1.2)
BUN SERPL-MCNC: 13 MG/DL (ref 7–22)
CALCIUM SERPL-MCNC: 9.1 MG/DL (ref 8.5–10.5)
CHLORIDE SERPL-SCNC: 107 MEQ/L (ref 98–111)
CHOLEST SERPL-MCNC: 256 MG/DL (ref 100–199)
CO2 SERPL-SCNC: 25 MEQ/L (ref 23–33)
CREAT SERPL-MCNC: 1 MG/DL (ref 0.4–1.2)
DEPRECATED MEAN GLUCOSE BLD GHB EST-ACNC: 108 MG/DL (ref 70–126)
GFR SERPL CREATININE-BSD FRML MDRD: > 60 ML/MIN/1.73M2
GLUCOSE SERPL-MCNC: 121 MG/DL (ref 70–108)
HBA1C MFR BLD HPLC: 5.6 % (ref 4.4–6.4)
HDLC SERPL-MCNC: 60 MG/DL
LDLC SERPL CALC-MCNC: 171 MG/DL
POTASSIUM SERPL-SCNC: 4.3 MEQ/L (ref 3.5–5.2)
PROT SERPL-MCNC: 6.8 G/DL (ref 6.1–8)
PSA SERPL-MCNC: 0.96 NG/ML (ref 0–1)
SODIUM SERPL-SCNC: 144 MEQ/L (ref 135–145)
TRIGL SERPL-MCNC: 125 MG/DL (ref 0–199)

## 2023-10-17 ENCOUNTER — TELEPHONE (OUTPATIENT)
Dept: FAMILY MEDICINE CLINIC | Age: 58
End: 2023-10-17

## 2023-10-17 DIAGNOSIS — E78.2 MIXED HYPERLIPIDEMIA: Primary | ICD-10-CM

## 2023-10-17 RX ORDER — ROSUVASTATIN CALCIUM 10 MG/1
10 TABLET, COATED ORAL NIGHTLY
Qty: 30 TABLET | Refills: 3 | Status: SHIPPED | OUTPATIENT
Start: 2023-10-17

## 2023-10-17 NOTE — TELEPHONE ENCOUNTER
Discussion noted. Will start Crestor 10 mg - 1 pill daily. #30/1 refill. Check total cholesterol, D-LDL, AST/ALT in 2-3 months. Please let patient know. Orders placed.   ES

## 2023-10-17 NOTE — TELEPHONE ENCOUNTER
----- Message from Cain Murray MD sent at 10/16/2023  7:22 AM EDT -----  Notify pt-   Results reviewed. FLP okay, except TC elevated 256 and LDL elevated at 171- current guidelines (any value > 5%) suggest starting cholesterol medicine due to elevated 10 year risk of CVD (7.5%) -is pt interested/ willing to consider starting cholesterol medicine? CMP okay, except glucose elevated at 121  HG A1c okay  PSA okay  Let me know.   ES

## 2023-10-17 NOTE — TELEPHONE ENCOUNTER
This nurse spoke with patient. Advised him of results. Ana Paula Marie states he will start medication to help with cholesterol.  Please send script to [facility] on [address]

## 2024-10-21 NOTE — PROGRESS NOTES
[facility]ICINE [facility]ACT[facility]  [address]  [facility] [address]  Dept: [phone]  Dept Fax: [phone]  LYN Welch is a 59 y.o.male    Pt presents for annual wellness physical exam.      Patient also presents for follow-up of hyperlipidemia and erectile dysfunction.     Pt admits to not taking Crestor regularly.      Patient doing \"great\" at this time- denies any new complaints.   History of Present Illness  The patient is a 59-year-old male who is here today for an annual wellness physical exam.    He has received two COVID-19 vaccines and does not wish to receive any more. He also declines the influenza vaccine. His last tetanus vaccine was administered in 2018. He has received the shingles vaccine.     His last colonoscopy was performed in 2018, and he is currently in the process of scheduling another one. He uses hydrocortisone cream as needed for hemorrhoids and over-the-counter ketoconazole shampoo.     He was prescribed rosuvastatin 10 mg daily, but he has not been taking it. He does not take aspirin or any other medications. He takes Viagra 100 mg as needed and requires a refill.    He has never smoked. He does not experience headaches. He reports no fevers, chills, nausea, vomiting, diarrhea, constipation, dizziness, blurry vision, chest pain, shortness of breath, arm pain, neck pain, jaw pain, palpitations, unusual sweating, difficulty climbing stairs, carrying groceries, exercising, or lying down at night due to shortness of breath. He does not wake up short of breath. He reports no swelling in his hands or feet, black tarry bowel movements, bright red blood in his bowel movements, or burning or blood during urination. His energy levels are good.     He does not monitor his blood sugar or blood pressure at home and has not had any issues with his blood pressure. He believes his weight is around 204 pounds.    Glucometer readings at home are not

## 2024-10-30 ASSESSMENT — PATIENT HEALTH QUESTIONNAIRE - PHQ9
SUM OF ALL RESPONSES TO PHQ9 QUESTIONS 1 & 2: 0
1. LITTLE INTEREST OR PLEASURE IN DOING THINGS: NOT AT ALL
SUM OF ALL RESPONSES TO PHQ QUESTIONS 1-9: 0
SUM OF ALL RESPONSES TO PHQ QUESTIONS 1-9: 0
1. LITTLE INTEREST OR PLEASURE IN DOING THINGS: NOT AT ALL
2. FEELING DOWN, DEPRESSED OR HOPELESS: NOT AT ALL
SUM OF ALL RESPONSES TO PHQ QUESTIONS 1-9: 0
2. FEELING DOWN, DEPRESSED OR HOPELESS: NOT AT ALL
SUM OF ALL RESPONSES TO PHQ QUESTIONS 1-9: 0
SUM OF ALL RESPONSES TO PHQ9 QUESTIONS 1 & 2: 0

## 2024-10-31 ENCOUNTER — OFFICE VISIT (OUTPATIENT)
Dept: FAMILY MEDICINE CLINIC | Age: 59
End: 2024-10-31
Payer: COMMERCIAL

## 2024-10-31 VITALS
WEIGHT: 208 LBS | BODY MASS INDEX: 30.81 KG/M2 | HEART RATE: 74 BPM | TEMPERATURE: 98.2 F | OXYGEN SATURATION: 98 % | DIASTOLIC BLOOD PRESSURE: 62 MMHG | HEIGHT: 69 IN | SYSTOLIC BLOOD PRESSURE: 110 MMHG | RESPIRATION RATE: 18 BRPM

## 2024-10-31 DIAGNOSIS — Z13.1 SCREENING FOR DIABETES MELLITUS: ICD-10-CM

## 2024-10-31 DIAGNOSIS — Z13.220 LIPID SCREENING: ICD-10-CM

## 2024-10-31 DIAGNOSIS — Z00.00 WELL ADULT EXAM: Primary | ICD-10-CM

## 2024-10-31 DIAGNOSIS — N52.9 ERECTILE DYSFUNCTION, UNSPECIFIED ERECTILE DYSFUNCTION TYPE: ICD-10-CM

## 2024-10-31 DIAGNOSIS — Z00.00 LABORATORY EXAM ORDERED AS PART OF ROUTINE GENERAL MEDICAL EXAMINATION: ICD-10-CM

## 2024-10-31 DIAGNOSIS — R73.01 IFG (IMPAIRED FASTING GLUCOSE): ICD-10-CM

## 2024-10-31 DIAGNOSIS — E78.2 MIXED HYPERLIPIDEMIA: ICD-10-CM

## 2024-10-31 DIAGNOSIS — Z12.5 SCREENING PSA (PROSTATE SPECIFIC ANTIGEN): ICD-10-CM

## 2024-10-31 PROCEDURE — 99396 PREV VISIT EST AGE 40-64: CPT | Performed by: FAMILY MEDICINE

## 2024-10-31 RX ORDER — SILDENAFIL 100 MG/1
100 TABLET, FILM COATED ORAL DAILY PRN
Qty: 30 TABLET | Refills: 2 | Status: SHIPPED | OUTPATIENT
Start: 2024-10-31

## 2024-10-31 SDOH — ECONOMIC STABILITY: INCOME INSECURITY: HOW HARD IS IT FOR YOU TO PAY FOR THE VERY BASICS LIKE FOOD, HOUSING, MEDICAL CARE, AND HEATING?: NOT HARD AT ALL

## 2024-10-31 SDOH — ECONOMIC STABILITY: FOOD INSECURITY: WITHIN THE PAST 12 MONTHS, THE FOOD YOU BOUGHT JUST DIDN'T LAST AND YOU DIDN'T HAVE MONEY TO GET MORE.: NEVER TRUE

## 2024-10-31 SDOH — ECONOMIC STABILITY: FOOD INSECURITY: WITHIN THE PAST 12 MONTHS, YOU WORRIED THAT YOUR FOOD WOULD RUN OUT BEFORE YOU GOT MONEY TO BUY MORE.: NEVER TRUE

## 2024-10-31 ASSESSMENT — ENCOUNTER SYMPTOMS
BLOOD IN STOOL: 0
CHEST TIGHTNESS: 0
ABDOMINAL PAIN: 0
VOMITING: 0
SHORTNESS OF BREATH: 0
NAUSEA: 0
DIARRHEA: 0
ANAL BLEEDING: 0
CONSTIPATION: 0

## 2024-10-31 NOTE — PATIENT INSTRUCTIONS
Continue to work on diet, exercise, and weight loss for optimal cardiovascular health  Refills  Check HG A1c, FLP, CMP, spot MA, TSH with reflex T4, CBC, and PSA at this time.  Follow up in 12 months.                Preventive Health Topics (updated 10/31/2024):  Encouraged COVID VACCINE booster-patient declines  Encouraged FLU VACCINE-patient declines  COLONOSCOPY done 4/17/2018 per Dr. Paul- 1 polyp-was to do again in 2023-patient has called office and pt states \"paperwork is coming\" - to get scheduled ASAP.

## 2024-11-14 ENCOUNTER — LAB (OUTPATIENT)
Dept: LAB | Age: 59
End: 2024-11-14

## 2024-11-14 DIAGNOSIS — Z13.220 LIPID SCREENING: ICD-10-CM

## 2024-11-14 DIAGNOSIS — N52.9 ERECTILE DYSFUNCTION, UNSPECIFIED ERECTILE DYSFUNCTION TYPE: ICD-10-CM

## 2024-11-14 DIAGNOSIS — R73.01 IFG (IMPAIRED FASTING GLUCOSE): ICD-10-CM

## 2024-11-14 DIAGNOSIS — Z00.00 LABORATORY EXAM ORDERED AS PART OF ROUTINE GENERAL MEDICAL EXAMINATION: ICD-10-CM

## 2024-11-14 DIAGNOSIS — E78.2 MIXED HYPERLIPIDEMIA: ICD-10-CM

## 2024-11-14 DIAGNOSIS — Z12.5 SCREENING PSA (PROSTATE SPECIFIC ANTIGEN): ICD-10-CM

## 2024-11-14 DIAGNOSIS — Z13.1 SCREENING FOR DIABETES MELLITUS: ICD-10-CM

## 2024-11-14 LAB
ALBUMIN SERPL BCG-MCNC: 4.4 G/DL (ref 3.5–5.1)
ALP SERPL-CCNC: 64 U/L (ref 38–126)
ALT SERPL W/O P-5'-P-CCNC: 50 U/L (ref 11–66)
ANION GAP SERPL CALC-SCNC: 13 MEQ/L (ref 8–16)
AST SERPL-CCNC: 35 U/L (ref 5–40)
BASOPHILS ABSOLUTE: 0.1 THOU/MM3 (ref 0–0.1)
BASOPHILS NFR BLD AUTO: 1.1 %
BILIRUB SERPL-MCNC: 0.9 MG/DL (ref 0.3–1.2)
BUN SERPL-MCNC: 9 MG/DL (ref 7–22)
CALCIUM SERPL-MCNC: 9.4 MG/DL (ref 8.5–10.5)
CHLORIDE SERPL-SCNC: 104 MEQ/L (ref 98–111)
CHOLEST SERPL-MCNC: 238 MG/DL (ref 100–199)
CO2 SERPL-SCNC: 25 MEQ/L (ref 23–33)
CREAT SERPL-MCNC: 0.8 MG/DL (ref 0.4–1.2)
CREAT UR-MCNC: 82.1 MG/DL
DEPRECATED MEAN GLUCOSE BLD GHB EST-ACNC: 105 MG/DL (ref 70–126)
DEPRECATED RDW RBC AUTO: 44.4 FL (ref 35–45)
EOSINOPHIL NFR BLD AUTO: 2 %
EOSINOPHILS ABSOLUTE: 0.1 THOU/MM3 (ref 0–0.4)
ERYTHROCYTE [DISTWIDTH] IN BLOOD BY AUTOMATED COUNT: 13.2 % (ref 11.5–14.5)
GFR SERPL CREATININE-BSD FRML MDRD: > 90 ML/MIN/1.73M2
GLUCOSE SERPL-MCNC: 85 MG/DL (ref 70–108)
HBA1C MFR BLD HPLC: 5.5 % (ref 4.4–6.4)
HCT VFR BLD AUTO: 46.7 % (ref 42–52)
HDLC SERPL-MCNC: 68 MG/DL
HGB BLD-MCNC: 15.8 GM/DL (ref 14–18)
IMM GRANULOCYTES # BLD AUTO: 0.01 THOU/MM3 (ref 0–0.07)
IMM GRANULOCYTES NFR BLD AUTO: 0.2 %
LDLC SERPL CALC-MCNC: 149 MG/DL
LYMPHOCYTES ABSOLUTE: 1.9 THOU/MM3 (ref 1–4.8)
LYMPHOCYTES NFR BLD AUTO: 34.4 %
MCH RBC QN AUTO: 30.9 PG (ref 26–33)
MCHC RBC AUTO-ENTMCNC: 33.8 GM/DL (ref 32.2–35.5)
MCV RBC AUTO: 91.4 FL (ref 80–94)
MICROALBUMIN UR-MCNC: < 1.2 MG/DL
MICROALBUMIN/CREAT RATIO PNL UR: 15 MG/G (ref 0–30)
MONOCYTES ABSOLUTE: 0.5 THOU/MM3 (ref 0.4–1.3)
MONOCYTES NFR BLD AUTO: 10 %
NEUTROPHILS ABSOLUTE: 2.8 THOU/MM3 (ref 1.8–7.7)
NEUTROPHILS NFR BLD AUTO: 52.3 %
NRBC BLD AUTO-RTO: 0 /100 WBC
PLATELET # BLD AUTO: 316 THOU/MM3 (ref 130–400)
PMV BLD AUTO: 9.9 FL (ref 9.4–12.4)
POTASSIUM SERPL-SCNC: 4.1 MEQ/L (ref 3.5–5.2)
PROT SERPL-MCNC: 7.1 G/DL (ref 6.1–8)
PSA SERPL-MCNC: 0.78 NG/ML (ref 0–1)
RBC # BLD AUTO: 5.11 MILL/MM3 (ref 4.7–6.1)
SODIUM SERPL-SCNC: 142 MEQ/L (ref 135–145)
TRIGL SERPL-MCNC: 104 MG/DL (ref 0–199)
TSH SERPL DL<=0.005 MIU/L-ACNC: 2.45 UIU/ML (ref 0.4–4.2)
WBC # BLD AUTO: 5.4 THOU/MM3 (ref 4.8–10.8)

## 2024-11-15 ENCOUNTER — TELEPHONE (OUTPATIENT)
Dept: FAMILY MEDICINE CLINIC | Age: 59
End: 2024-11-15

## 2024-11-15 DIAGNOSIS — E78.2 MIXED HYPERLIPIDEMIA: ICD-10-CM

## 2024-11-15 RX ORDER — ROSUVASTATIN CALCIUM 10 MG/1
10 TABLET, COATED ORAL NIGHTLY
Qty: 90 TABLET | Refills: 3 | Status: SHIPPED | OUTPATIENT
Start: 2024-11-15

## 2025-03-05 DIAGNOSIS — E78.2 MIXED HYPERLIPIDEMIA: ICD-10-CM

## 2025-03-05 RX ORDER — ROSUVASTATIN CALCIUM 10 MG/1
10 TABLET, COATED ORAL NIGHTLY
Qty: 90 TABLET | Refills: 3 | OUTPATIENT
Start: 2025-03-05